# Patient Record
Sex: MALE | Race: WHITE | NOT HISPANIC OR LATINO | URBAN - METROPOLITAN AREA
[De-identification: names, ages, dates, MRNs, and addresses within clinical notes are randomized per-mention and may not be internally consistent; named-entity substitution may affect disease eponyms.]

---

## 2017-01-04 ENCOUNTER — EMERGENCY (EMERGENCY)
Facility: HOSPITAL | Age: 52
LOS: 1 days | End: 2017-01-04
Payer: MEDICAID

## 2017-01-04 DIAGNOSIS — Z93.0 TRACHEOSTOMY STATUS: Chronic | ICD-10-CM

## 2017-01-04 PROCEDURE — 99285 EMERGENCY DEPT VISIT HI MDM: CPT | Mod: 25

## 2017-01-05 PROCEDURE — 74177 CT ABD & PELVIS W/CONTRAST: CPT | Mod: 26

## 2017-01-11 ENCOUNTER — EMERGENCY (EMERGENCY)
Facility: HOSPITAL | Age: 52
LOS: 1 days | End: 2017-01-11
Payer: MEDICAID

## 2017-01-11 DIAGNOSIS — Z93.0 TRACHEOSTOMY STATUS: Chronic | ICD-10-CM

## 2017-01-11 PROCEDURE — 99284 EMERGENCY DEPT VISIT MOD MDM: CPT | Mod: 25

## 2017-01-12 PROCEDURE — 71010: CPT | Mod: 26

## 2017-01-18 ENCOUNTER — EMERGENCY (EMERGENCY)
Facility: HOSPITAL | Age: 52
LOS: 1 days | End: 2017-01-18
Payer: MEDICAID

## 2017-01-18 DIAGNOSIS — Z93.0 TRACHEOSTOMY STATUS: Chronic | ICD-10-CM

## 2017-01-18 PROCEDURE — 99284 EMERGENCY DEPT VISIT MOD MDM: CPT

## 2017-01-25 ENCOUNTER — EMERGENCY (EMERGENCY)
Facility: HOSPITAL | Age: 52
LOS: 1 days | End: 2017-01-25
Payer: MEDICAID

## 2017-01-25 DIAGNOSIS — Z93.0 TRACHEOSTOMY STATUS: Chronic | ICD-10-CM

## 2017-01-25 PROCEDURE — 99283 EMERGENCY DEPT VISIT LOW MDM: CPT | Mod: 25

## 2017-02-08 ENCOUNTER — OUTPATIENT (OUTPATIENT)
Dept: OUTPATIENT SERVICES | Facility: HOSPITAL | Age: 52
LOS: 1 days | End: 2017-02-08
Payer: MEDICAID

## 2017-02-08 DIAGNOSIS — Z93.0 TRACHEOSTOMY STATUS: Chronic | ICD-10-CM

## 2017-02-08 PROCEDURE — 74177 CT ABD & PELVIS W/CONTRAST: CPT | Mod: 26

## 2017-02-18 ENCOUNTER — EMERGENCY (EMERGENCY)
Facility: HOSPITAL | Age: 52
LOS: 1 days | End: 2017-02-18
Payer: MEDICAID

## 2017-02-18 DIAGNOSIS — Z93.0 TRACHEOSTOMY STATUS: Chronic | ICD-10-CM

## 2017-02-18 PROCEDURE — 99283 EMERGENCY DEPT VISIT LOW MDM: CPT | Mod: 25

## 2017-02-23 ENCOUNTER — EMERGENCY (EMERGENCY)
Facility: HOSPITAL | Age: 52
LOS: 1 days | End: 2017-02-23
Payer: MEDICAID

## 2017-02-23 DIAGNOSIS — Z93.0 TRACHEOSTOMY STATUS: Chronic | ICD-10-CM

## 2017-02-23 PROCEDURE — 99283 EMERGENCY DEPT VISIT LOW MDM: CPT | Mod: 25

## 2017-03-07 ENCOUNTER — EMERGENCY (EMERGENCY)
Facility: HOSPITAL | Age: 52
LOS: 1 days | End: 2017-03-07
Payer: MEDICAID

## 2017-03-07 DIAGNOSIS — Z93.0 TRACHEOSTOMY STATUS: Chronic | ICD-10-CM

## 2017-03-07 PROCEDURE — 99283 EMERGENCY DEPT VISIT LOW MDM: CPT | Mod: 25

## 2017-03-21 ENCOUNTER — EMERGENCY (EMERGENCY)
Facility: HOSPITAL | Age: 52
LOS: 1 days | End: 2017-03-21
Payer: MEDICAID

## 2017-03-21 DIAGNOSIS — Z93.0 TRACHEOSTOMY STATUS: Chronic | ICD-10-CM

## 2017-03-21 PROCEDURE — 99283 EMERGENCY DEPT VISIT LOW MDM: CPT | Mod: 25

## 2017-04-11 ENCOUNTER — EMERGENCY (EMERGENCY)
Facility: HOSPITAL | Age: 52
LOS: 1 days | End: 2017-04-11
Payer: MEDICAID

## 2017-04-11 DIAGNOSIS — Z93.0 TRACHEOSTOMY STATUS: Chronic | ICD-10-CM

## 2017-04-11 PROCEDURE — 99283 EMERGENCY DEPT VISIT LOW MDM: CPT | Mod: 25

## 2017-04-26 ENCOUNTER — EMERGENCY (EMERGENCY)
Facility: HOSPITAL | Age: 52
LOS: 1 days | End: 2017-04-26
Payer: MEDICAID

## 2017-04-26 DIAGNOSIS — Z93.0 TRACHEOSTOMY STATUS: Chronic | ICD-10-CM

## 2017-04-26 PROCEDURE — 99283 EMERGENCY DEPT VISIT LOW MDM: CPT | Mod: 25

## 2017-05-10 ENCOUNTER — EMERGENCY (EMERGENCY)
Facility: HOSPITAL | Age: 52
LOS: 1 days | End: 2017-05-10
Payer: MEDICAID

## 2017-05-10 DIAGNOSIS — Z93.0 TRACHEOSTOMY STATUS: Chronic | ICD-10-CM

## 2017-05-10 PROCEDURE — 99284 EMERGENCY DEPT VISIT MOD MDM: CPT | Mod: 25

## 2017-06-14 ENCOUNTER — EMERGENCY (EMERGENCY)
Facility: HOSPITAL | Age: 52
LOS: 1 days | End: 2017-06-14

## 2017-06-14 DIAGNOSIS — Z93.0 TRACHEOSTOMY STATUS: Chronic | ICD-10-CM

## 2017-06-16 ENCOUNTER — EMERGENCY (EMERGENCY)
Facility: HOSPITAL | Age: 52
LOS: 1 days | End: 2017-06-16
Payer: MEDICAID

## 2017-06-16 DIAGNOSIS — Z93.0 TRACHEOSTOMY STATUS: Chronic | ICD-10-CM

## 2017-06-16 PROCEDURE — 99284 EMERGENCY DEPT VISIT MOD MDM: CPT | Mod: 25

## 2017-06-17 ENCOUNTER — EMERGENCY (EMERGENCY)
Facility: HOSPITAL | Age: 52
LOS: 1 days | End: 2017-06-17
Payer: MEDICAID

## 2017-06-17 DIAGNOSIS — Z93.0 TRACHEOSTOMY STATUS: Chronic | ICD-10-CM

## 2017-06-17 PROCEDURE — 99284 EMERGENCY DEPT VISIT MOD MDM: CPT

## 2017-06-22 ENCOUNTER — APPOINTMENT (OUTPATIENT)
Dept: PAIN MANAGEMENT | Facility: CLINIC | Age: 52
End: 2017-06-22

## 2017-07-04 ENCOUNTER — EMERGENCY (EMERGENCY)
Facility: HOSPITAL | Age: 52
LOS: 1 days | End: 2017-07-04
Payer: MEDICAID

## 2017-07-04 DIAGNOSIS — Z93.0 TRACHEOSTOMY STATUS: Chronic | ICD-10-CM

## 2017-07-04 PROCEDURE — 99284 EMERGENCY DEPT VISIT MOD MDM: CPT | Mod: 25

## 2017-07-27 ENCOUNTER — EMERGENCY (EMERGENCY)
Facility: HOSPITAL | Age: 52
LOS: 1 days | End: 2017-07-27
Payer: MEDICAID

## 2017-07-27 DIAGNOSIS — Z93.0 TRACHEOSTOMY STATUS: Chronic | ICD-10-CM

## 2017-07-27 PROCEDURE — 99284 EMERGENCY DEPT VISIT MOD MDM: CPT | Mod: 25

## 2017-08-07 ENCOUNTER — EMERGENCY (EMERGENCY)
Facility: HOSPITAL | Age: 52
LOS: 1 days | End: 2017-08-07
Payer: MEDICAID

## 2017-08-07 DIAGNOSIS — Z93.0 TRACHEOSTOMY STATUS: Chronic | ICD-10-CM

## 2017-08-07 PROCEDURE — 99283 EMERGENCY DEPT VISIT LOW MDM: CPT | Mod: 25

## 2017-09-07 ENCOUNTER — EMERGENCY (EMERGENCY)
Facility: HOSPITAL | Age: 52
LOS: 1 days | End: 2017-09-07
Payer: MEDICAID

## 2017-09-07 DIAGNOSIS — Z93.0 TRACHEOSTOMY STATUS: Chronic | ICD-10-CM

## 2017-09-07 PROCEDURE — 99283 EMERGENCY DEPT VISIT LOW MDM: CPT | Mod: 25

## 2017-09-18 ENCOUNTER — EMERGENCY (EMERGENCY)
Facility: HOSPITAL | Age: 52
LOS: 1 days | End: 2017-09-18
Payer: MEDICAID

## 2017-09-18 DIAGNOSIS — Z93.0 TRACHEOSTOMY STATUS: Chronic | ICD-10-CM

## 2017-09-18 PROCEDURE — 99284 EMERGENCY DEPT VISIT MOD MDM: CPT | Mod: 25

## 2017-09-27 ENCOUNTER — EMERGENCY (EMERGENCY)
Facility: HOSPITAL | Age: 52
LOS: 1 days | End: 2017-09-27

## 2017-09-27 DIAGNOSIS — Z93.0 TRACHEOSTOMY STATUS: Chronic | ICD-10-CM

## 2017-11-24 ENCOUNTER — EMERGENCY (EMERGENCY)
Facility: HOSPITAL | Age: 52
LOS: 1 days | End: 2017-11-24
Payer: MEDICAID

## 2017-11-24 DIAGNOSIS — Z93.0 TRACHEOSTOMY STATUS: Chronic | ICD-10-CM

## 2017-11-24 PROCEDURE — 99284 EMERGENCY DEPT VISIT MOD MDM: CPT | Mod: 25

## 2017-12-01 ENCOUNTER — EMERGENCY (EMERGENCY)
Facility: HOSPITAL | Age: 52
LOS: 1 days | End: 2017-12-01
Payer: MEDICAID

## 2017-12-01 DIAGNOSIS — Z93.0 TRACHEOSTOMY STATUS: Chronic | ICD-10-CM

## 2017-12-01 PROCEDURE — 99284 EMERGENCY DEPT VISIT MOD MDM: CPT | Mod: 25

## 2017-12-15 ENCOUNTER — EMERGENCY (EMERGENCY)
Facility: HOSPITAL | Age: 52
LOS: 1 days | End: 2017-12-15
Payer: MEDICAID

## 2017-12-15 DIAGNOSIS — Z93.0 TRACHEOSTOMY STATUS: Chronic | ICD-10-CM

## 2017-12-15 PROCEDURE — 99284 EMERGENCY DEPT VISIT MOD MDM: CPT | Mod: 25

## 2017-12-19 ENCOUNTER — EMERGENCY (EMERGENCY)
Facility: HOSPITAL | Age: 52
LOS: 1 days | End: 2017-12-19
Payer: MEDICAID

## 2017-12-19 DIAGNOSIS — Z93.0 TRACHEOSTOMY STATUS: Chronic | ICD-10-CM

## 2017-12-19 PROCEDURE — 99284 EMERGENCY DEPT VISIT MOD MDM: CPT

## 2017-12-22 ENCOUNTER — EMERGENCY (EMERGENCY)
Facility: HOSPITAL | Age: 52
LOS: 1 days | End: 2017-12-22
Payer: MEDICAID

## 2017-12-22 DIAGNOSIS — Z93.0 TRACHEOSTOMY STATUS: Chronic | ICD-10-CM

## 2017-12-22 PROCEDURE — 99283 EMERGENCY DEPT VISIT LOW MDM: CPT | Mod: 25

## 2017-12-28 ENCOUNTER — EMERGENCY (EMERGENCY)
Facility: HOSPITAL | Age: 52
LOS: 1 days | End: 2017-12-28
Payer: MEDICAID

## 2017-12-28 DIAGNOSIS — Z93.0 TRACHEOSTOMY STATUS: Chronic | ICD-10-CM

## 2017-12-28 PROCEDURE — 99284 EMERGENCY DEPT VISIT MOD MDM: CPT | Mod: 25

## 2018-01-03 ENCOUNTER — EMERGENCY (EMERGENCY)
Facility: HOSPITAL | Age: 53
LOS: 1 days | End: 2018-01-03

## 2018-01-03 DIAGNOSIS — Z93.0 TRACHEOSTOMY STATUS: Chronic | ICD-10-CM

## 2018-01-06 ENCOUNTER — EMERGENCY (EMERGENCY)
Facility: HOSPITAL | Age: 53
LOS: 1 days | End: 2018-01-06
Payer: MEDICAID

## 2018-01-06 DIAGNOSIS — Z93.0 TRACHEOSTOMY STATUS: Chronic | ICD-10-CM

## 2018-01-06 PROCEDURE — 99284 EMERGENCY DEPT VISIT MOD MDM: CPT

## 2018-01-10 ENCOUNTER — EMERGENCY (EMERGENCY)
Facility: HOSPITAL | Age: 53
LOS: 1 days | End: 2018-01-10
Payer: MEDICAID

## 2018-01-10 DIAGNOSIS — Z93.0 TRACHEOSTOMY STATUS: Chronic | ICD-10-CM

## 2018-01-10 PROCEDURE — 99282 EMERGENCY DEPT VISIT SF MDM: CPT | Mod: 25

## 2018-01-17 ENCOUNTER — EMERGENCY (EMERGENCY)
Facility: HOSPITAL | Age: 53
LOS: 1 days | End: 2018-01-17
Payer: MEDICAID

## 2018-01-17 DIAGNOSIS — Z93.0 TRACHEOSTOMY STATUS: Chronic | ICD-10-CM

## 2018-01-17 PROCEDURE — 99283 EMERGENCY DEPT VISIT LOW MDM: CPT | Mod: 25

## 2018-01-24 ENCOUNTER — EMERGENCY (EMERGENCY)
Facility: HOSPITAL | Age: 53
LOS: 1 days | End: 2018-01-24
Payer: MEDICAID

## 2018-01-24 DIAGNOSIS — Z93.0 TRACHEOSTOMY STATUS: Chronic | ICD-10-CM

## 2018-01-24 PROCEDURE — 99283 EMERGENCY DEPT VISIT LOW MDM: CPT | Mod: 25

## 2018-01-31 ENCOUNTER — EMERGENCY (EMERGENCY)
Facility: HOSPITAL | Age: 53
LOS: 1 days | End: 2018-01-31
Payer: MEDICAID

## 2018-01-31 DIAGNOSIS — Z93.0 TRACHEOSTOMY STATUS: Chronic | ICD-10-CM

## 2018-01-31 PROCEDURE — 99283 EMERGENCY DEPT VISIT LOW MDM: CPT | Mod: 25

## 2018-02-09 ENCOUNTER — EMERGENCY (EMERGENCY)
Facility: HOSPITAL | Age: 53
LOS: 1 days | End: 2018-02-09
Payer: MEDICAID

## 2018-02-09 DIAGNOSIS — Z93.0 TRACHEOSTOMY STATUS: Chronic | ICD-10-CM

## 2018-02-09 PROCEDURE — 99284 EMERGENCY DEPT VISIT MOD MDM: CPT | Mod: 25

## 2018-02-16 ENCOUNTER — EMERGENCY (EMERGENCY)
Facility: HOSPITAL | Age: 53
LOS: 1 days | End: 2018-02-16
Payer: MEDICAID

## 2018-02-16 DIAGNOSIS — Z93.0 TRACHEOSTOMY STATUS: Chronic | ICD-10-CM

## 2018-02-16 PROCEDURE — 99282 EMERGENCY DEPT VISIT SF MDM: CPT | Mod: 25

## 2018-02-21 ENCOUNTER — EMERGENCY (EMERGENCY)
Facility: HOSPITAL | Age: 53
LOS: 1 days | End: 2018-02-21
Payer: MEDICAID

## 2018-02-21 DIAGNOSIS — Z93.0 TRACHEOSTOMY STATUS: Chronic | ICD-10-CM

## 2018-02-21 PROCEDURE — 99285 EMERGENCY DEPT VISIT HI MDM: CPT | Mod: 25

## 2018-02-22 PROCEDURE — 74177 CT ABD & PELVIS W/CONTRAST: CPT | Mod: 26

## 2018-02-22 PROCEDURE — 71045 X-RAY EXAM CHEST 1 VIEW: CPT | Mod: 26

## 2018-02-28 ENCOUNTER — EMERGENCY (EMERGENCY)
Facility: HOSPITAL | Age: 53
LOS: 1 days | End: 2018-02-28
Payer: MEDICAID

## 2018-02-28 DIAGNOSIS — Z93.0 TRACHEOSTOMY STATUS: Chronic | ICD-10-CM

## 2018-02-28 PROCEDURE — 99284 EMERGENCY DEPT VISIT MOD MDM: CPT | Mod: 25

## 2018-03-05 ENCOUNTER — EMERGENCY (EMERGENCY)
Facility: HOSPITAL | Age: 53
LOS: 1 days | End: 2018-03-05
Payer: MEDICAID

## 2018-03-05 DIAGNOSIS — Z93.0 TRACHEOSTOMY STATUS: Chronic | ICD-10-CM

## 2018-03-05 PROCEDURE — 99284 EMERGENCY DEPT VISIT MOD MDM: CPT | Mod: 25

## 2018-03-07 ENCOUNTER — EMERGENCY (EMERGENCY)
Facility: HOSPITAL | Age: 53
LOS: 1 days | End: 2018-03-07
Payer: MEDICAID

## 2018-03-07 DIAGNOSIS — Z93.0 TRACHEOSTOMY STATUS: Chronic | ICD-10-CM

## 2018-03-07 PROCEDURE — 99283 EMERGENCY DEPT VISIT LOW MDM: CPT | Mod: 25

## 2018-03-15 ENCOUNTER — EMERGENCY (EMERGENCY)
Facility: HOSPITAL | Age: 53
LOS: 1 days | End: 2018-03-15
Payer: MEDICAID

## 2018-03-15 DIAGNOSIS — Z93.0 TRACHEOSTOMY STATUS: Chronic | ICD-10-CM

## 2018-03-15 PROCEDURE — 99283 EMERGENCY DEPT VISIT LOW MDM: CPT | Mod: 25

## 2018-04-20 ENCOUNTER — EMERGENCY (EMERGENCY)
Facility: HOSPITAL | Age: 53
LOS: 1 days | End: 2018-04-20
Payer: MEDICAID

## 2018-04-20 DIAGNOSIS — Z93.0 TRACHEOSTOMY STATUS: Chronic | ICD-10-CM

## 2018-04-20 PROCEDURE — 99283 EMERGENCY DEPT VISIT LOW MDM: CPT | Mod: 25

## 2018-04-25 ENCOUNTER — EMERGENCY (EMERGENCY)
Facility: HOSPITAL | Age: 53
LOS: 1 days | End: 2018-04-25
Payer: MEDICAID

## 2018-04-25 DIAGNOSIS — Z93.0 TRACHEOSTOMY STATUS: Chronic | ICD-10-CM

## 2018-04-25 PROCEDURE — 99283 EMERGENCY DEPT VISIT LOW MDM: CPT | Mod: 25

## 2018-04-29 ENCOUNTER — EMERGENCY (EMERGENCY)
Facility: HOSPITAL | Age: 53
LOS: 1 days | End: 2018-04-29
Payer: MEDICAID

## 2018-04-29 DIAGNOSIS — Z93.0 TRACHEOSTOMY STATUS: Chronic | ICD-10-CM

## 2018-04-29 PROCEDURE — 72110 X-RAY EXAM L-2 SPINE 4/>VWS: CPT | Mod: 26

## 2018-04-29 PROCEDURE — 99283 EMERGENCY DEPT VISIT LOW MDM: CPT | Mod: 25

## 2018-05-01 ENCOUNTER — EMERGENCY (EMERGENCY)
Facility: HOSPITAL | Age: 53
LOS: 1 days | End: 2018-05-01
Payer: MEDICAID

## 2018-05-01 DIAGNOSIS — Z93.0 TRACHEOSTOMY STATUS: Chronic | ICD-10-CM

## 2018-05-01 PROCEDURE — 99283 EMERGENCY DEPT VISIT LOW MDM: CPT

## 2018-05-02 ENCOUNTER — EMERGENCY (EMERGENCY)
Facility: HOSPITAL | Age: 53
LOS: 1 days | End: 2018-05-02
Payer: MEDICAID

## 2018-05-02 DIAGNOSIS — Z93.0 TRACHEOSTOMY STATUS: Chronic | ICD-10-CM

## 2018-05-02 PROCEDURE — 99283 EMERGENCY DEPT VISIT LOW MDM: CPT | Mod: 25

## 2018-05-10 ENCOUNTER — EMERGENCY (EMERGENCY)
Facility: HOSPITAL | Age: 53
LOS: 1 days | End: 2018-05-10
Payer: MEDICAID

## 2018-05-10 DIAGNOSIS — Z93.0 TRACHEOSTOMY STATUS: Chronic | ICD-10-CM

## 2018-05-10 PROCEDURE — 99283 EMERGENCY DEPT VISIT LOW MDM: CPT | Mod: 25

## 2018-05-17 ENCOUNTER — EMERGENCY (EMERGENCY)
Facility: HOSPITAL | Age: 53
LOS: 1 days | End: 2018-05-17
Payer: MEDICAID

## 2018-05-17 DIAGNOSIS — Z93.0 TRACHEOSTOMY STATUS: Chronic | ICD-10-CM

## 2018-05-17 PROCEDURE — 99283 EMERGENCY DEPT VISIT LOW MDM: CPT | Mod: 25

## 2018-05-20 ENCOUNTER — EMERGENCY (EMERGENCY)
Facility: HOSPITAL | Age: 53
LOS: 1 days | End: 2018-05-20
Payer: MEDICAID

## 2018-05-20 DIAGNOSIS — Z93.0 TRACHEOSTOMY STATUS: Chronic | ICD-10-CM

## 2018-05-20 PROCEDURE — 99283 EMERGENCY DEPT VISIT LOW MDM: CPT | Mod: 25

## 2018-05-23 ENCOUNTER — EMERGENCY (EMERGENCY)
Facility: HOSPITAL | Age: 53
LOS: 1 days | End: 2018-05-23
Payer: MEDICAID

## 2018-05-23 DIAGNOSIS — Z93.0 TRACHEOSTOMY STATUS: Chronic | ICD-10-CM

## 2018-05-23 PROCEDURE — 99283 EMERGENCY DEPT VISIT LOW MDM: CPT | Mod: 25

## 2018-05-31 ENCOUNTER — EMERGENCY (EMERGENCY)
Facility: HOSPITAL | Age: 53
LOS: 1 days | End: 2018-05-31
Payer: MEDICAID

## 2018-05-31 DIAGNOSIS — Z93.0 TRACHEOSTOMY STATUS: Chronic | ICD-10-CM

## 2018-05-31 PROCEDURE — 99283 EMERGENCY DEPT VISIT LOW MDM: CPT | Mod: 25

## 2018-06-07 ENCOUNTER — EMERGENCY (EMERGENCY)
Facility: HOSPITAL | Age: 53
LOS: 1 days | End: 2018-06-07
Payer: MEDICAID

## 2018-06-07 DIAGNOSIS — Z93.0 TRACHEOSTOMY STATUS: Chronic | ICD-10-CM

## 2018-06-07 PROCEDURE — 99283 EMERGENCY DEPT VISIT LOW MDM: CPT | Mod: 25

## 2018-06-10 ENCOUNTER — EMERGENCY (EMERGENCY)
Facility: HOSPITAL | Age: 53
LOS: 1 days | End: 2018-06-10
Payer: MEDICAID

## 2018-06-10 DIAGNOSIS — Z93.0 TRACHEOSTOMY STATUS: Chronic | ICD-10-CM

## 2018-06-10 PROCEDURE — 99283 EMERGENCY DEPT VISIT LOW MDM: CPT | Mod: 25

## 2018-06-13 ENCOUNTER — EMERGENCY (EMERGENCY)
Facility: HOSPITAL | Age: 53
LOS: 1 days | End: 2018-06-13
Payer: MEDICAID

## 2018-06-13 DIAGNOSIS — Z93.0 TRACHEOSTOMY STATUS: Chronic | ICD-10-CM

## 2018-06-13 PROCEDURE — 99282 EMERGENCY DEPT VISIT SF MDM: CPT | Mod: 25

## 2018-06-14 ENCOUNTER — EMERGENCY (EMERGENCY)
Facility: HOSPITAL | Age: 53
LOS: 1 days | End: 2018-06-14
Payer: MEDICAID

## 2018-06-14 DIAGNOSIS — Z93.0 TRACHEOSTOMY STATUS: Chronic | ICD-10-CM

## 2018-06-14 PROCEDURE — 99283 EMERGENCY DEPT VISIT LOW MDM: CPT | Mod: 25

## 2018-07-04 ENCOUNTER — EMERGENCY (EMERGENCY)
Facility: HOSPITAL | Age: 53
LOS: 1 days | End: 2018-07-04
Payer: MEDICAID

## 2018-07-04 DIAGNOSIS — Z93.0 TRACHEOSTOMY STATUS: Chronic | ICD-10-CM

## 2018-07-04 PROCEDURE — 99283 EMERGENCY DEPT VISIT LOW MDM: CPT | Mod: 25

## 2018-07-09 ENCOUNTER — EMERGENCY (EMERGENCY)
Facility: HOSPITAL | Age: 53
LOS: 1 days | End: 2018-07-09
Payer: MEDICAID

## 2018-07-09 DIAGNOSIS — Z93.0 TRACHEOSTOMY STATUS: Chronic | ICD-10-CM

## 2018-07-09 PROCEDURE — 72131 CT LUMBAR SPINE W/O DYE: CPT | Mod: 26

## 2018-07-09 PROCEDURE — 99284 EMERGENCY DEPT VISIT MOD MDM: CPT | Mod: 25

## 2018-07-11 ENCOUNTER — EMERGENCY (EMERGENCY)
Facility: HOSPITAL | Age: 53
LOS: 1 days | End: 2018-07-11
Payer: MEDICAID

## 2018-07-11 DIAGNOSIS — Z93.0 TRACHEOSTOMY STATUS: Chronic | ICD-10-CM

## 2018-07-11 PROCEDURE — 99283 EMERGENCY DEPT VISIT LOW MDM: CPT | Mod: 25

## 2018-07-16 ENCOUNTER — EMERGENCY (EMERGENCY)
Facility: HOSPITAL | Age: 53
LOS: 1 days | End: 2018-07-16
Payer: MEDICAID

## 2018-07-16 DIAGNOSIS — Z93.0 TRACHEOSTOMY STATUS: Chronic | ICD-10-CM

## 2018-07-16 PROCEDURE — 99283 EMERGENCY DEPT VISIT LOW MDM: CPT

## 2018-07-22 ENCOUNTER — EMERGENCY (EMERGENCY)
Facility: HOSPITAL | Age: 53
LOS: 1 days | End: 2018-07-22
Payer: MEDICAID

## 2018-07-22 DIAGNOSIS — Z93.0 TRACHEOSTOMY STATUS: Chronic | ICD-10-CM

## 2018-07-22 PROCEDURE — 99282 EMERGENCY DEPT VISIT SF MDM: CPT | Mod: 25

## 2018-08-02 ENCOUNTER — EMERGENCY (EMERGENCY)
Facility: HOSPITAL | Age: 53
LOS: 1 days | End: 2018-08-02
Payer: MEDICAID

## 2018-08-02 DIAGNOSIS — Z93.0 TRACHEOSTOMY STATUS: Chronic | ICD-10-CM

## 2018-08-02 PROCEDURE — 99283 EMERGENCY DEPT VISIT LOW MDM: CPT | Mod: 25

## 2018-08-16 ENCOUNTER — EMERGENCY (EMERGENCY)
Facility: HOSPITAL | Age: 53
LOS: 1 days | End: 2018-08-16
Payer: MEDICAID

## 2018-08-16 DIAGNOSIS — Z93.0 TRACHEOSTOMY STATUS: Chronic | ICD-10-CM

## 2018-08-16 PROCEDURE — 99283 EMERGENCY DEPT VISIT LOW MDM: CPT | Mod: 25

## 2018-08-17 ENCOUNTER — EMERGENCY (EMERGENCY)
Facility: HOSPITAL | Age: 53
LOS: 1 days | Discharge: HOME | End: 2018-08-17
Attending: EMERGENCY MEDICINE
Payer: MEDICAID

## 2018-08-17 ENCOUNTER — APPOINTMENT (OUTPATIENT)
Dept: ORTHOPEDIC SURGERY | Facility: CLINIC | Age: 53
End: 2018-08-17
Payer: MEDICAID

## 2018-08-17 VITALS
TEMPERATURE: 97 F | HEART RATE: 98 BPM | DIASTOLIC BLOOD PRESSURE: 90 MMHG | SYSTOLIC BLOOD PRESSURE: 153 MMHG | OXYGEN SATURATION: 98 % | RESPIRATION RATE: 16 BRPM

## 2018-08-17 VITALS
DIASTOLIC BLOOD PRESSURE: 104 MMHG | SYSTOLIC BLOOD PRESSURE: 158 MMHG | HEART RATE: 102 BPM | WEIGHT: 195 LBS | BODY MASS INDEX: 28.88 KG/M2 | HEIGHT: 69 IN

## 2018-08-17 VITALS — HEIGHT: 70 IN | WEIGHT: 205.03 LBS

## 2018-08-17 DIAGNOSIS — Z93.0 TRACHEOSTOMY STATUS: Chronic | ICD-10-CM

## 2018-08-17 PROCEDURE — 99283 EMERGENCY DEPT VISIT LOW MDM: CPT

## 2018-08-17 PROCEDURE — 99205 OFFICE O/P NEW HI 60 MIN: CPT

## 2018-08-17 RX ORDER — FENTANYL CITRATE 50 UG/ML
1 INJECTION INTRAVENOUS ONCE
Qty: 0 | Refills: 0 | Status: DISCONTINUED | OUTPATIENT
Start: 2018-08-17 | End: 2018-08-17

## 2018-08-17 RX ADMIN — FENTANYL CITRATE 1 PATCH: 50 INJECTION INTRAVENOUS at 09:41

## 2018-08-17 NOTE — ED STATDOCS - NS ED ROS FT
no fever  no chest pain  no SOB  no abd pain  no HA  +Back pain  All other ROS negative except as per HPI

## 2018-08-17 NOTE — ED ADULT TRIAGE NOTE - CHIEF COMPLAINT QUOTE
low back pain , chronic pain, was referred to pain mgmt low back pain , chronic pain, was referred to pain mgmt, states ran out of meds,

## 2018-08-17 NOTE — ED STATDOCS - PSH
Abdominal Hernia Repair  Abdominal and left inguinal  History of Spinal Surgery  lumbar spinal fusion;  2001  History of Tonsillectomy  as a child  Laryngoscopy  s/p Laryngoscopy, 8/2010 and 12/2010  Malignant neoplasm of larynx  Laryngoscopy/Bronchoscopy with biopsy-7/2013  Status post tracheostomy

## 2018-08-17 NOTE — ED STATDOCS - MEDICAL DECISION MAKING DETAILS
Pt with chronic back pain. Give fentanyl patch, D/C with followup with pain management Pt with chronic back pain. Give fentanyl patch, D/C with followup with pain management. Patient given detailed discharge and return instructions and verbalized understanding.  Patient will follow up without fail.  All questions answered.

## 2018-08-17 NOTE — ED STATDOCS - OBJECTIVE STATEMENT
CC: Back pain, medication refill  Presenting symptoms: 52 y/o M pt with hx of laryngeal cancer and chronic back pain presents to ED for medication refill of Percocet for chronic back pain  Pertinent Positives: back pain  Pertinent Negatives:   Timing: Persistent  Quality: Tingling  Radiation: Right leg  Severity: Moderate  Aggravating Factors: None  Relieving Factors: None  Pt saw Dr. Brock today. Is f/u for MRI to see about surgical eligibility for chronic pain. Did not get pain medication from him and ran out. Will get another appointment as soon as possible CC: Back pain, medication refill  Presenting symptoms: 54 y/o M pt with hx of laryngeal cancer and chronic back pain presents to ED for medication refill of Percocet for chronic back pain  Pertinent Positives: back pain  Pertinent Negatives:   Timing: Persistent  Quality: Aching  Radiation: Right leg  Severity: Moderate  Aggravating Factors: None  Relieving Factors: None  Pt saw Dr. Brock today. Is f/u for MRI to see about surgical eligibility for chronic pain. Did not get pain medication from him and ran out. Will get another appointment as soon as possible

## 2018-08-17 NOTE — ED STATDOCS - PHYSICAL EXAMINATION
Constitutional: Alert, NAD.   Head: Atraumatic.   Eyes: Clear bilaterally. PERRL.   Cardiac: Normal rate.   Respiratory: Breath sounds clear bilaterally.   GI: Abdomen soft, non-tender, no guarding.   : No CVA or bladder tenderness.   Musculoskeletal: FROM, no muscle or joint tenderness or swelling.   Neuro: alert and oriented, no focal deficits, no motor or sensory deficits.   Skin: Dry, intact, no rash.   Psych: normal mood and affect.

## 2018-08-17 NOTE — ED STATDOCS - PMH
Accidental Fall  with lumbar spine injury in 2000  Anxiety    Family history of chemotherapy  9226-1107  History of Laryngeal Cancer  tx with radiation and chemo  History of radiation therapy  8316-2378  Malignant neoplasm of larynx    Sciatica of right side    Voice Disturbance

## 2018-08-28 ENCOUNTER — APPOINTMENT (OUTPATIENT)
Dept: ORTHOPEDIC SURGERY | Facility: CLINIC | Age: 53
End: 2018-08-28
Payer: MEDICAID

## 2018-08-28 ENCOUNTER — EMERGENCY (EMERGENCY)
Facility: HOSPITAL | Age: 53
LOS: 1 days | Discharge: HOME | End: 2018-08-28
Attending: EMERGENCY MEDICINE
Payer: MEDICAID

## 2018-08-28 VITALS
BODY MASS INDEX: 28.88 KG/M2 | HEIGHT: 69 IN | HEART RATE: 90 BPM | DIASTOLIC BLOOD PRESSURE: 91 MMHG | WEIGHT: 195 LBS | SYSTOLIC BLOOD PRESSURE: 147 MMHG

## 2018-08-28 VITALS
TEMPERATURE: 98 F | RESPIRATION RATE: 20 BRPM | WEIGHT: 205.03 LBS | DIASTOLIC BLOOD PRESSURE: 87 MMHG | HEIGHT: 70 IN | SYSTOLIC BLOOD PRESSURE: 132 MMHG | HEART RATE: 87 BPM | OXYGEN SATURATION: 97 %

## 2018-08-28 DIAGNOSIS — M70.61 TROCHANTERIC BURSITIS, RIGHT HIP: ICD-10-CM

## 2018-08-28 DIAGNOSIS — M79.1 MYALGIA: ICD-10-CM

## 2018-08-28 DIAGNOSIS — Z93.0 TRACHEOSTOMY STATUS: Chronic | ICD-10-CM

## 2018-08-28 PROCEDURE — 99284 EMERGENCY DEPT VISIT MOD MDM: CPT

## 2018-08-28 PROCEDURE — 20610 DRAIN/INJ JOINT/BURSA W/O US: CPT | Mod: RT

## 2018-08-28 PROCEDURE — 99284 EMERGENCY DEPT VISIT MOD MDM: CPT | Mod: 25

## 2018-08-28 PROCEDURE — 72100 X-RAY EXAM L-S SPINE 2/3 VWS: CPT

## 2018-08-28 PROCEDURE — 96372 THER/PROPH/DIAG INJ SC/IM: CPT

## 2018-08-28 PROCEDURE — 99214 OFFICE O/P EST MOD 30 MIN: CPT | Mod: 25

## 2018-08-28 RX ORDER — HYDROMORPHONE HYDROCHLORIDE 2 MG/ML
2 INJECTION INTRAMUSCULAR; INTRAVENOUS; SUBCUTANEOUS ONCE
Qty: 0 | Refills: 0 | Status: DISCONTINUED | OUTPATIENT
Start: 2018-08-28 | End: 2018-08-28

## 2018-08-28 RX ADMIN — HYDROMORPHONE HYDROCHLORIDE 2 MILLIGRAM(S): 2 INJECTION INTRAMUSCULAR; INTRAVENOUS; SUBCUTANEOUS at 10:30

## 2018-08-28 NOTE — ED ADULT NURSE NOTE - RESPIRATORY ASSESSMENT
Problem: Safety  Goal: Will remain free from falls  Bed in low locked position, call light within reach. Pt instructed to use call light for assistance. Pt verbalized understanding.         WDL

## 2018-08-28 NOTE — ED ADULT NURSE NOTE - NSIMPLEMENTINTERV_GEN_ALL_ED
Implemented All Universal Safety Interventions:  [place] to call system. Call bell, personal items and telephone within reach. Instruct patient to call for assistance. Room bathroom lighting operational. Non-slip footwear when patient is off stretcher. Physically safe environment: no spills, clutter or unnecessary equipment. Stretcher in lowest position, wheels locked, appropriate side rails in place.

## 2018-08-28 NOTE — ED STATDOCS - PMH
Accidental Fall  with lumbar spine injury in 2000  Anxiety    Family history of chemotherapy  3068-9706  History of Laryngeal Cancer  tx with radiation and chemo  History of radiation therapy  2985-7021  Malignant neoplasm of larynx    Sciatica of right side    Voice Disturbance

## 2018-08-28 NOTE — ED STATDOCS - OBJECTIVE STATEMENT
Patient is a 53 year old M with PMHx of laryngeal cancer and spine injury who presents to the ED complaining of back pain.  Patient states that he is waiting authorization for his pain management and CT scan.  Reports that his L5 is crushed and the pain has been worsening.  States that he takes Percocet  and has been on it since 2007.  Denies any other medical complaints at this time. Patient is a 53 year old M with PMHx of laryngeal cancer and chronic back pain who presents to the ED complaining of back pain.  Patient states that he is awaiting authorization for his pain management and CT scan.  Reports that his L5 is crushed and the pain has been worsening, as he could not sleep last night due to the pain.  States that he takes Percocet  and has been on it since 2007.  Denies any other medical complaints at this time.

## 2018-08-28 NOTE — ED ADULT NURSE NOTE - PMH
Accidental Fall  with lumbar spine injury in 2000  Anxiety    Family history of chemotherapy  0906-8967  History of Laryngeal Cancer  tx with radiation and chemo  History of radiation therapy  5429-9940  Malignant neoplasm of larynx    Sciatica of right side    Voice Disturbance

## 2018-08-31 ENCOUNTER — OTHER (OUTPATIENT)
Age: 53
End: 2018-08-31

## 2018-09-06 ENCOUNTER — APPOINTMENT (OUTPATIENT)
Dept: ORTHOPEDIC SURGERY | Facility: CLINIC | Age: 53
End: 2018-09-06
Payer: MEDICAID

## 2018-09-06 VITALS
HEART RATE: 103 BPM | HEIGHT: 69 IN | BODY MASS INDEX: 28.88 KG/M2 | SYSTOLIC BLOOD PRESSURE: 129 MMHG | WEIGHT: 195 LBS | DIASTOLIC BLOOD PRESSURE: 79 MMHG

## 2018-09-06 DIAGNOSIS — M48.061 SPINAL STENOSIS, LUMBAR REGION WITHOUT NEUROGENIC CLAUDICATION: ICD-10-CM

## 2018-09-06 PROCEDURE — 99214 OFFICE O/P EST MOD 30 MIN: CPT

## 2018-09-07 ENCOUNTER — MESSAGE (OUTPATIENT)
Age: 53
End: 2018-09-07

## 2018-09-08 ENCOUNTER — EMERGENCY (EMERGENCY)
Facility: HOSPITAL | Age: 53
LOS: 1 days | End: 2018-09-08
Attending: EMERGENCY MEDICINE
Payer: MEDICAID

## 2018-09-08 VITALS
WEIGHT: 205.03 LBS | HEIGHT: 70 IN | DIASTOLIC BLOOD PRESSURE: 86 MMHG | TEMPERATURE: 98 F | SYSTOLIC BLOOD PRESSURE: 135 MMHG | RESPIRATION RATE: 16 BRPM | OXYGEN SATURATION: 99 % | HEART RATE: 101 BPM

## 2018-09-08 DIAGNOSIS — Z93.0 TRACHEOSTOMY STATUS: Chronic | ICD-10-CM

## 2018-09-08 PROCEDURE — 99283 EMERGENCY DEPT VISIT LOW MDM: CPT

## 2018-09-08 RX ORDER — OXYCODONE AND ACETAMINOPHEN 5; 325 MG/1; MG/1
2 TABLET ORAL ONCE
Qty: 0 | Refills: 0 | Status: DISCONTINUED | OUTPATIENT
Start: 2018-09-08 | End: 2018-09-08

## 2018-09-08 RX ADMIN — OXYCODONE AND ACETAMINOPHEN 2 TABLET(S): 5; 325 TABLET ORAL at 13:56

## 2018-09-08 RX ADMIN — OXYCODONE AND ACETAMINOPHEN 2 TABLET(S): 5; 325 TABLET ORAL at 13:55

## 2018-09-08 NOTE — ED PROVIDER NOTE - CHPI ED SYMPTOMS NEG
no bowel dysfunction/no fatigue/no bladder dysfunction/no anorexia/no constipation/no tingling/no neck tenderness/no numbness/no motor function loss/no difficulty bearing weight

## 2018-09-08 NOTE — ED PROVIDER NOTE - MEDICAL DECISION MAKING DETAILS
The patient has chronic low back pain requesting percocet.  The patient ambulating well without neuro deificit

## 2018-09-08 NOTE — ED PROVIDER NOTE - PMH
Accidental Fall  with lumbar spine injury in 2000  Anxiety    Family history of chemotherapy  3444-8305  History of Laryngeal Cancer  tx with radiation and chemo  History of radiation therapy  0569-5654  Malignant neoplasm of larynx    Sciatica of right side    Voice Disturbance

## 2018-09-08 NOTE — ED ADULT NURSE NOTE - PMH
Accidental Fall  with lumbar spine injury in 2000  Anxiety    Family history of chemotherapy  7157-6402  History of Laryngeal Cancer  tx with radiation and chemo  History of radiation therapy  9381-7485  Malignant neoplasm of larynx    Sciatica of right side    Voice Disturbance

## 2018-09-08 NOTE — ED PROVIDER NOTE - OBJECTIVE STATEMENT
The patient is a 53 year old male with history of chronic low back pain presents with a fall requesting refill for percocet. No HA, No motor No sensory loss and no bladder no bowel dysfunction.  The patient already has appointment with pain management and also spine surgeon for surgery

## 2018-09-08 NOTE — ED ADULT NURSE NOTE - NSIMPLEMENTINTERV_GEN_ALL_ED
Implemented All Universal Safety Interventions:  [place] to call system. Call bell, personal items and telephone within reach. Instruct patient to call for assistance. Room bathroom lighting operational. Non-slip footwear when patient is off stretcher. Physically safe environment: no spills, clutter or unnecessary equipment. Stretcher in lowest position, wheels locked, appropriate side rails in place.

## 2018-09-08 NOTE — ED ADULT TRIAGE NOTE - CHIEF COMPLAINT QUOTE
pt biba from home c/o 10/10 lower back pain, states has surgery set up with dr gibson next month, trach pt

## 2018-09-08 NOTE — ED ADULT NURSE NOTE - OBJECTIVE STATEMENT
pt arrived with severe lower back pain 10/10 pt states has surgery set up for next month but pain was really bad today, pt with trachea due to esophageal ca

## 2018-09-10 ENCOUNTER — EMERGENCY (EMERGENCY)
Facility: HOSPITAL | Age: 53
LOS: 1 days | Discharge: HOME | End: 2018-09-10
Attending: EMERGENCY MEDICINE
Payer: MEDICAID

## 2018-09-10 VITALS
DIASTOLIC BLOOD PRESSURE: 99 MMHG | OXYGEN SATURATION: 94 % | RESPIRATION RATE: 18 BRPM | TEMPERATURE: 98 F | SYSTOLIC BLOOD PRESSURE: 157 MMHG | HEART RATE: 100 BPM

## 2018-09-10 VITALS — WEIGHT: 205.03 LBS | HEIGHT: 70 IN

## 2018-09-10 DIAGNOSIS — Z93.0 TRACHEOSTOMY STATUS: Chronic | ICD-10-CM

## 2018-09-10 PROCEDURE — 99283 EMERGENCY DEPT VISIT LOW MDM: CPT

## 2018-09-10 RX ORDER — OXYCODONE AND ACETAMINOPHEN 5; 325 MG/1; MG/1
1 TABLET ORAL ONCE
Qty: 0 | Refills: 0 | Status: DISCONTINUED | OUTPATIENT
Start: 2018-09-10 | End: 2018-09-10

## 2018-09-10 RX ORDER — IBUPROFEN 200 MG
600 TABLET ORAL ONCE
Qty: 0 | Refills: 0 | Status: COMPLETED | OUTPATIENT
Start: 2018-09-10 | End: 2018-09-10

## 2018-09-10 RX ORDER — MORPHINE SULFATE 50 MG/1
4 CAPSULE, EXTENDED RELEASE ORAL ONCE
Qty: 0 | Refills: 0 | Status: COMPLETED | OUTPATIENT
Start: 2018-09-10 | End: 2018-09-10

## 2018-09-10 RX ADMIN — OXYCODONE AND ACETAMINOPHEN 1 TABLET(S): 5; 325 TABLET ORAL at 13:32

## 2018-09-10 RX ADMIN — Medication 600 MILLIGRAM(S): at 13:32

## 2018-09-10 NOTE — ED ADULT TRIAGE NOTE - CHIEF COMPLAINT QUOTE
"I have lower back pain that is radiating down my right leg, I am supposed to have a surgery with Dr Brock but my pain is worsening"

## 2018-09-10 NOTE — ED STATDOCS - OBJECTIVE STATEMENT
52 y/o M with PMHx of lumbar spine injury, laryngeal cancer and R sided sciatica and PSHx of lumbar fusion and tracheostomy presents to ED c/o back pain radiating down the R leg, which is chronic but has been worsening over the past few days. Is scheduled for spine reconstruction surgery with Dr. Brock in a few weeks. Called orthopedic office this morning and was instructed to go to pain management doctor, but he was unable to tolerate the pain so came to the ED. 54 y/o M with PMHx of lumbar spine injury, laryngeal cancer and R sided sciatica and PSHx of lumbar fusion and tracheostomy presents to ED c/o back pain radiating down the R leg, which is chronic but has been worsening over the past few days. Is scheduled for spine reconstruction surgery with Dr. Brock in a few weeks. Called orthopedic office this morning and was instructed to go to pain management doctor (has an appointment scheduled for a few weeks from now), but he was unable to tolerate the pain so came to the ED.

## 2018-09-10 NOTE — ED ADULT NURSE NOTE - PMH
Accidental Fall  with lumbar spine injury in 2000  Anxiety    Family history of chemotherapy  3342-1638  History of Laryngeal Cancer  tx with radiation and chemo  History of radiation therapy  1649-5634  Malignant neoplasm of larynx    Sciatica of right side    Voice Disturbance

## 2018-09-10 NOTE — ED STATDOCS - PMH
Accidental Fall  with lumbar spine injury in 2000  Anxiety    Family history of chemotherapy  5831-1379  History of Laryngeal Cancer  tx with radiation and chemo  History of radiation therapy  2065-4526  Malignant neoplasm of larynx    Sciatica of right side    Voice Disturbance

## 2018-09-10 NOTE — ED ADULT NURSE NOTE - NSIMPLEMENTINTERV_GEN_ALL_ED
Implemented All Universal Safety Interventions:  [place] to call system. Call bell, personal items and telephone within reach. Instruct patient to call for assistance. Room bathroom lighting operational. Non-slip footwear when patient is off stretcher. Physically safe environment: no spills, clutter or unnecessary equipment. Stretcher in lowest position, wheels locked, appropriate side rails in place.

## 2018-09-11 ENCOUNTER — EMERGENCY (EMERGENCY)
Facility: HOSPITAL | Age: 53
LOS: 1 days | Discharge: HOME | End: 2018-09-11
Attending: EMERGENCY MEDICINE
Payer: MEDICAID

## 2018-09-11 VITALS — TEMPERATURE: 98 F | SYSTOLIC BLOOD PRESSURE: 155 MMHG | HEART RATE: 75 BPM | DIASTOLIC BLOOD PRESSURE: 96 MMHG

## 2018-09-11 VITALS — WEIGHT: 205.03 LBS | HEIGHT: 70 IN

## 2018-09-11 DIAGNOSIS — Z93.0 TRACHEOSTOMY STATUS: Chronic | ICD-10-CM

## 2018-09-11 DIAGNOSIS — F43.22 ADJUSTMENT DISORDER WITH ANXIETY: ICD-10-CM

## 2018-09-11 DIAGNOSIS — R69 ILLNESS, UNSPECIFIED: ICD-10-CM

## 2018-09-11 PROCEDURE — 99283 EMERGENCY DEPT VISIT LOW MDM: CPT

## 2018-09-11 PROCEDURE — 99284 EMERGENCY DEPT VISIT MOD MDM: CPT

## 2018-09-11 RX ORDER — FENTANYL CITRATE 50 UG/ML
1 INJECTION INTRAVENOUS
Qty: 0 | Refills: 0 | Status: DISCONTINUED | OUTPATIENT
Start: 2018-09-11 | End: 2018-09-11

## 2018-09-11 NOTE — ED STATDOCS - OBJECTIVE STATEMENT
52 y/o M pt with hx of chronic back pain, back surgery, and laryngeal CA in 2007 presents to ED c/o back pain. Pt was seen yesterday for back pain and was discharged home. Pt is awaiting back surgery with Dr. Brock and has an appointment with pain management next week. Pt has had multiple ED visits seeking pain medication. Denies fever and chills. No further complaints at this time.

## 2018-09-11 NOTE — ED ADULT TRIAGE NOTE - CHIEF COMPLAINT QUOTE
"I was here yesterday for back pain, I need surgery with Dr Brock but they sent me home and I am still in pain. " Pt A & OX4.

## 2018-09-11 NOTE — ED BEHAVIORAL HEALTH ASSESSMENT NOTE - DESCRIPTION
uneventful  focused on pain medications  Vital Signs Last 24 Hrs  T(C): 36.6 (11 Sep 2018 10:03), Max: 36.7 (10 Sep 2018 12:04)  T(F): 97.8 (11 Sep 2018 10:03), Max: 98 (10 Sep 2018 12:04)  HR: 75 (11 Sep 2018 10:03) (75 - 100)  BP: 155/96 (11 Sep 2018 10:03) (155/96 - 157/99)  RR: 18 (10 Sep 2018 12:04) (18 - 18)  SpO2: 94% (10 Sep 2018 12:04) (94% - 94%) back injury sciatica chronic neuropathic pain laryngeal carcinoma tracheostomy disabled

## 2018-09-11 NOTE — ED BEHAVIORAL HEALTH ASSESSMENT NOTE - HPI (INCLUDE ILLNESS QUALITY, SEVERITY, DURATION, TIMING, CONTEXT, MODIFYING FACTORS, ASSOCIATED SIGNS AND SYMPTOMS)
patient with frequent visits to ED for pain Living in DSS motel placement nearby Extensive medical history with reported plan for back surgery with dr Brock next month  [place] pain management was contacted by ED They do not recommend opioids in fact advise drug rehab or methadone maintenance neither of which patient feels needed  Patient denies past psych history Does not endorse any psychotic symptoms nor substance abuse uses

## 2018-09-11 NOTE — ED STATDOCS - PMH
Accidental Fall  with lumbar spine injury in 2000  Anxiety    Family history of chemotherapy  9365-4579  History of Laryngeal Cancer  tx with radiation and chemo  History of radiation therapy  9085-5771  Malignant neoplasm of larynx    Sciatica of right side    Voice Disturbance

## 2018-09-11 NOTE — ED ADULT NURSE NOTE - PMH
Accidental Fall  with lumbar spine injury in 2000  Anxiety    Family history of chemotherapy  2152-0217  History of Laryngeal Cancer  tx with radiation and chemo  History of radiation therapy  6305-4142  Malignant neoplasm of larynx    Sciatica of right side    Voice Disturbance

## 2018-09-11 NOTE — ED STATDOCS - PROGRESS NOTE DETAILS
Patient was Istop multiple narcotic prescriptions have been given to patient, patients pain management doctor called, according to pain management has not followed up since july, they recommend methadone, for pt to be in outpatient rehab, no narcotics, pt refuse4=s to see providers in pain management because they are not prescribing narcotics, dr. zuniga called to evaluate the pt Pt eloped

## 2018-09-11 NOTE — ED BEHAVIORAL HEALTH ASSESSMENT NOTE - CURRENT MEDICATION
Home Medications:  albuterol CFC free 90 mcg/inh inhalation aerosol: 2 puff(s) inhaled every 6 hours, As needed, Shortness of Breath and/or Wheezing (11 Sep 2018 10:20)

## 2018-09-11 NOTE — ED BEHAVIORAL HEALTH ASSESSMENT NOTE - SUMMARY
53 yr old with chronic pain issues neuropathic as well as history of laryngeal cancer No indication of illicit substance use disorder

## 2018-09-11 NOTE — ED STATDOCS - NS_ ATTENDINGSCRIBEDETAILS _ED_A_ED_FT
I, Kuldip Irving, performed the initial face to face bedside interview with this patient regarding history of present illness, review of symptoms and relevant past medical, social and family history.  I completed an independent physical examination.  I was the initial provider who evaluated this patient. I have signed out the follow up of any pending tests (i.e. labs, radiological studies) to the ACP.  I have communicated the patient’s plan of care and disposition with the ACP.  The history, relevant review of systems, past medical and surgical history, medical decision making, and physical examination was documented by the scribe in my presence and I attest to the accuracy of the documentation.

## 2018-09-11 NOTE — ED ADULT NURSE NOTE - OBJECTIVE STATEMENT
54 y/o male presents to ed reporting generalized back pain chronic. reports has appt with pain management next monday. patient has no pain medications. was seen here yesterday for same.

## 2018-09-13 NOTE — ED PROVIDER NOTE - OBJECTIVE STATEMENT
54 yo male pmh laryngeal ca, back pain on percocet for pain comes to ed for increased pain in lower back ; pt sees pain managent and is scheduled for back surgery with Dr Brock; denies any urinary or fecal incontinence; pt noted running out of pain meds

## 2018-09-13 NOTE — ED PROVIDER NOTE - PMH
Accidental Fall  with lumbar spine injury in 2000  Anxiety    Family history of chemotherapy  4618-1628  History of Laryngeal Cancer  tx with radiation and chemo  History of radiation therapy  9898-4190  Malignant neoplasm of larynx    Sciatica of right side    Voice Disturbance

## 2018-09-13 NOTE — ED PROVIDER NOTE - CARE PLAN
Principal Discharge DX:	Adjustment disorder with anxious mood  Secondary Diagnosis:	Opioid dependence with current use

## 2018-10-23 ENCOUNTER — EMERGENCY (EMERGENCY)
Facility: HOSPITAL | Age: 53
LOS: 1 days | Discharge: HOME | End: 2018-10-23
Attending: EMERGENCY MEDICINE
Payer: MEDICAID

## 2018-10-23 ENCOUNTER — APPOINTMENT (OUTPATIENT)
Dept: ORTHOPEDIC SURGERY | Facility: CLINIC | Age: 53
End: 2018-10-23
Payer: MEDICAID

## 2018-10-23 VITALS
DIASTOLIC BLOOD PRESSURE: 92 MMHG | SYSTOLIC BLOOD PRESSURE: 161 MMHG | TEMPERATURE: 98 F | HEART RATE: 75 BPM | RESPIRATION RATE: 17 BRPM | OXYGEN SATURATION: 96 %

## 2018-10-23 VITALS
HEART RATE: 80 BPM | HEIGHT: 69 IN | SYSTOLIC BLOOD PRESSURE: 149 MMHG | WEIGHT: 195 LBS | DIASTOLIC BLOOD PRESSURE: 89 MMHG | BODY MASS INDEX: 28.88 KG/M2

## 2018-10-23 VITALS — HEIGHT: 70 IN | WEIGHT: 199.96 LBS

## 2018-10-23 DIAGNOSIS — L59.8 OTHER SPECIFIED DISORDERS OF THE SKIN AND SUBCUTANEOUS TISSUE RELATED TO RADIATION: ICD-10-CM

## 2018-10-23 DIAGNOSIS — Z93.0 TRACHEOSTOMY STATUS: Chronic | ICD-10-CM
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
## 2018-10-23 PROCEDURE — 96372 THER/PROPH/DIAG INJ SC/IM: CPT

## 2018-10-23 PROCEDURE — 72110 X-RAY EXAM L-2 SPINE 4/>VWS: CPT

## 2018-10-23 PROCEDURE — 99283 EMERGENCY DEPT VISIT LOW MDM: CPT | Mod: 25

## 2018-10-23 PROCEDURE — 99215 OFFICE O/P EST HI 40 MIN: CPT

## 2018-10-23 PROCEDURE — 99283 EMERGENCY DEPT VISIT LOW MDM: CPT

## 2018-10-23 RX ORDER — ACETAMINOPHEN 500 MG
975 TABLET ORAL ONCE
Qty: 0 | Refills: 0 | Status: COMPLETED | OUTPATIENT
Start: 2018-10-23 | End: 2018-10-23

## 2018-10-23 RX ORDER — METHOCARBAMOL 500 MG/1
1500 TABLET, FILM COATED ORAL ONCE
Qty: 0 | Refills: 0 | Status: COMPLETED | OUTPATIENT
Start: 2018-10-23 | End: 2018-10-23

## 2018-10-23 RX ORDER — DEXAMETHASONE 0.5 MG/5ML
10 ELIXIR ORAL ONCE
Qty: 0 | Refills: 0 | Status: COMPLETED | OUTPATIENT
Start: 2018-10-23 | End: 2018-10-23

## 2018-10-23 RX ORDER — LIDOCAINE 4 G/100G
1 CREAM TOPICAL ONCE
Qty: 0 | Refills: 0 | Status: COMPLETED | OUTPATIENT
Start: 2018-10-23 | End: 2018-10-23

## 2018-10-23 RX ADMIN — METHOCARBAMOL 1500 MILLIGRAM(S): 500 TABLET, FILM COATED ORAL at 09:20

## 2018-10-23 RX ADMIN — Medication 975 MILLIGRAM(S): at 09:20

## 2018-10-23 RX ADMIN — LIDOCAINE 1 PATCH: 4 CREAM TOPICAL at 09:20

## 2018-10-23 RX ADMIN — Medication 10 MILLIGRAM(S): at 09:20

## 2018-10-23 NOTE — ED STATDOCS - OBJECTIVE STATEMENT
This is a 53 year old male with pmhx laryngeal CA s/p chemo with trach c/o chronic back pain, has been seen in ED multiple times requesting narcotics.  Patient is on suboxone.  Reports f/u with pain management currently taking 10mg of OXY he reports "isnt helping" and "needs something stronger."  He notes has appointment on Monday to adjust medications.  He reports recently , living in [place] and sleeping on sofa couch.  He reports "knows ER cant prescribe him something stronger" but would like some medication in ED.  He reports IBU "does nothing for him" and is "afraid will get a stomach ulcer" he "reports brother is a MD."  He notes follows up with MD hernandez and next month is doing SX to remove hardware and reconstructive surgery.  He denies any LE weakness or issues with bowel habits.  He denies any trauma or falls.

## 2018-10-23 NOTE — ED ADULT NURSE NOTE - NSIMPLEMENTINTERV_GEN_ALL_ED
Implemented All Universal Safety Interventions:  [place] to call system. Call bell, personal items and telephone within reach. Instruct patient to call for assistance. Room bathroom lighting operational. Non-slip footwear when patient is off stretcher. Physically safe environment: no spills, clutter or unnecessary equipment. Stretcher in lowest position, wheels locked, appropriate side rails in place.

## 2018-10-23 NOTE — ED ADULT TRIAGE NOTE - CHIEF COMPLAINT QUOTE
"I have lower back pain and was just next door and I don't see pain management until Monday and I need something for pain. " Pt A & Ox4.

## 2018-10-23 NOTE — ED STATDOCS - PROGRESS NOTE DETAILS
Patient ISTOPPED +active case received OXY 10mg on 10/11 dispensed on 10/17 45 pills 15 day supply.  At length discussion had with patient no Narcotics will be given in ED setting or RX.  Signed contract with pain management.  Advised to f/u with spine and pain management will TX with supportive care with tylenol, robaxin and lidoderm patch and steriod shot.

## 2018-10-23 NOTE — ED STATDOCS - PMH
Accidental Fall  with lumbar spine injury in 2000  Anxiety    Family history of chemotherapy  3220-7004  History of Laryngeal Cancer  tx with radiation and chemo  History of radiation therapy  6064-2269  Malignant neoplasm of larynx    Sciatica of right side    Voice Disturbance

## 2018-10-23 NOTE — ED STATDOCS - ATTENDING CONTRIBUTION TO CARE
seen with acp  hx of chronic back pain on maintenannce narcotics  no neuro findings  Agree with acps assessment hx andphysical  will not prescribe narcotics

## 2018-10-23 NOTE — ED ADULT NURSE NOTE - PMH
Accidental Fall  with lumbar spine injury in 2000  Anxiety    Family history of chemotherapy  5183-1277  History of Laryngeal Cancer  tx with radiation and chemo  History of radiation therapy  5730-5196  Malignant neoplasm of larynx    Sciatica of right side    Voice Disturbance

## 2018-10-23 NOTE — ED ADULT NURSE NOTE - OBJECTIVE STATEMENT
53 year old male a&ox3 comes to ED complaining of chronic generalized back pain and blood work to be done. patient does not see pain management until Monday. PA at bedside to evaluate patient.

## 2018-10-23 NOTE — ED STATDOCS - MUSCULOSKELETAL FINDINGS, MLM
TENDERNESS TENDERNESS/+midline lower lumbar scar, +paraspinal tenderness, no skin changes, no midline tenderness, straight leg test intact

## 2018-10-30 LAB — COTININE SERPL-MCNC: ABNORMAL

## 2018-11-02 ENCOUNTER — OTHER (OUTPATIENT)
Age: 53
End: 2018-11-02

## 2018-11-27 ENCOUNTER — MESSAGE (OUTPATIENT)
Age: 53
End: 2018-11-27

## 2018-12-08 ENCOUNTER — EMERGENCY (EMERGENCY)
Facility: HOSPITAL | Age: 53
LOS: 1 days | End: 2018-12-08
Payer: MEDICAID

## 2018-12-08 DIAGNOSIS — Z93.0 TRACHEOSTOMY STATUS: Chronic | ICD-10-CM

## 2018-12-08 PROCEDURE — 99283 EMERGENCY DEPT VISIT LOW MDM: CPT

## 2019-01-01 NOTE — ED STATDOCS - PSH
male  born at Gestational Age: 39w2d  to a 26 y.o.    via Vaginal, Spontaneous. GBS - PNL -. jerad- . ROM 3 hr PTD. breastfeeding. Down -7% since birth.  Feedings going well per mother.  Repeat bilirubin yesterday was HIR. Repeat today was LIR    Cleared for discharge home with PCP follow-up in a few days, sooner if concerns develop.  Risk factors of exclusive breastfeeding and mother of  descent.  PCP: Angela Kolb MD      Abdominal Hernia Repair  Abdominal and left inguinal  History of Spinal Surgery  lumbar spinal fusion;  2001  History of Tonsillectomy  as a child  Laryngoscopy  s/p Laryngoscopy, 8/2010 and 12/2010  Malignant neoplasm of larynx  Laryngoscopy/Bronchoscopy with biopsy-7/2013  Status post tracheostomy

## 2019-01-08 ENCOUNTER — APPOINTMENT (OUTPATIENT)
Dept: ORTHOPEDIC SURGERY | Facility: CLINIC | Age: 54
End: 2019-01-08
Payer: MEDICAID

## 2019-01-08 ENCOUNTER — EMERGENCY (EMERGENCY)
Facility: HOSPITAL | Age: 54
LOS: 1 days | Discharge: HOME | End: 2019-01-08
Attending: EMERGENCY MEDICINE
Payer: MEDICAID

## 2019-01-08 VITALS
TEMPERATURE: 97 F | OXYGEN SATURATION: 98 % | HEART RATE: 98 BPM | WEIGHT: 205.03 LBS | RESPIRATION RATE: 18 BRPM | DIASTOLIC BLOOD PRESSURE: 85 MMHG | HEIGHT: 70 IN | SYSTOLIC BLOOD PRESSURE: 141 MMHG

## 2019-01-08 VITALS
HEART RATE: 97 BPM | DIASTOLIC BLOOD PRESSURE: 95 MMHG | HEIGHT: 69 IN | SYSTOLIC BLOOD PRESSURE: 147 MMHG | BODY MASS INDEX: 28.88 KG/M2 | WEIGHT: 195 LBS

## 2019-01-08 DIAGNOSIS — M43.16 SPONDYLOLISTHESIS, LUMBAR REGION: ICD-10-CM

## 2019-01-08 DIAGNOSIS — Z87.891 PERSONAL HISTORY OF NICOTINE DEPENDENCE: ICD-10-CM

## 2019-01-08 DIAGNOSIS — Z93.0 TRACHEOSTOMY STATUS: Chronic | ICD-10-CM

## 2019-01-08 DIAGNOSIS — G89.4 CHRONIC PAIN SYNDROME: ICD-10-CM

## 2019-01-08 DIAGNOSIS — Z98.1 ARTHRODESIS STATUS: ICD-10-CM

## 2019-01-08 PROCEDURE — 99215 OFFICE O/P EST HI 40 MIN: CPT

## 2019-01-08 PROCEDURE — 99283 EMERGENCY DEPT VISIT LOW MDM: CPT

## 2019-01-08 RX ORDER — OXYCODONE AND ACETAMINOPHEN 5; 325 MG/1; MG/1
1 TABLET ORAL
Qty: 8 | Refills: 0
Start: 2019-01-08 | End: 2019-01-09

## 2019-01-08 RX ORDER — OXYCODONE AND ACETAMINOPHEN 5; 325 MG/1; MG/1
2 TABLET ORAL ONCE
Qty: 0 | Refills: 0 | Status: DISCONTINUED | OUTPATIENT
Start: 2019-01-08 | End: 2019-01-08

## 2019-01-08 RX ADMIN — OXYCODONE AND ACETAMINOPHEN 2 TABLET(S): 5; 325 TABLET ORAL at 09:22

## 2019-01-08 NOTE — ED STATDOCS - MEDICAL DECISION MAKING DETAILS
impression: pt with known back pain, exacerbation after working, will treat with pain medication, pt has follow up today with spinal surgeon for continuation of pain management.

## 2019-01-08 NOTE — ED ADULT NURSE NOTE - PMH
Accidental Fall  with lumbar spine injury in 2000  Anxiety    Family history of chemotherapy  9425-8485  History of Laryngeal Cancer  tx with radiation and chemo  History of radiation therapy  0161-8877  Malignant neoplasm of larynx    Sciatica of right side    Voice Disturbance

## 2019-01-08 NOTE — ED STATDOCS - PMH
Accidental Fall  with lumbar spine injury in 2000  Anxiety    Family history of chemotherapy  9577-5632  History of Laryngeal Cancer  tx with radiation and chemo  History of radiation therapy  4548-5538  Malignant neoplasm of larynx    Sciatica of right side    Voice Disturbance

## 2019-01-08 NOTE — ED STATDOCS - OBJECTIVE STATEMENT
54 y/o M pt with PMHx back surgery (2000), throat CA (2007)  presents to the ED for worsening back pain.  Several days ago pt was at work and lifted up a garage door, which aggravated his back pain.  Pt follows up with a pain management doctor for chronic back pain, last month doctor wanted to place pt on oxycodone, but pt refused.  Pt now wants to find a new pain management doctor, but is currently out of medications until he finds a new doctor.  Pt has an upcoming back reconstruction/revision surgery scheduled with Dr. Brock.  Pt has a follow up appt with Dr. Brock today.  Pt takes Percocet and valium for pain.  Denies fever, chills, N/V, CP, numbness.  No further acute complaints at this time.   Spinal Surgeon: Dr. Brock

## 2019-01-08 NOTE — ED ADULT NURSE NOTE - OBJECTIVE STATEMENT
pt found sitting in chair. pt is alert and oriented x 3, pt states that he was helping his brother at work and "twisted the wrong way". pt states he has previous back surgery. he states that he took ibuprofen at home but no relief and the pain started two days ago. pt given oxycodone as ordered.

## 2019-01-08 NOTE — ED ADULT TRIAGE NOTE - CHIEF COMPLAINT QUOTE
Patient arrived to ED today with c/o lower back pain.  Patient has chronic back issues.  Patient also goes to pain management and he recently had to leave that practice.  Patient reports he "ran out of his medicine". Patient is to have reconstructive surgery on his back in the near future by Dr. Brock.

## 2019-02-06 ENCOUNTER — EMERGENCY (EMERGENCY)
Facility: HOSPITAL | Age: 54
LOS: 1 days | Discharge: LEFT BEFORE DISCHARGE | End: 2019-02-06
Payer: MEDICAID

## 2019-02-06 ENCOUNTER — OTHER (OUTPATIENT)
Age: 54
End: 2019-02-06

## 2019-02-06 VITALS
SYSTOLIC BLOOD PRESSURE: 132 MMHG | DIASTOLIC BLOOD PRESSURE: 70 MMHG | HEART RATE: 84 BPM | OXYGEN SATURATION: 96 % | TEMPERATURE: 98 F | HEIGHT: 70 IN | WEIGHT: 205.03 LBS | RESPIRATION RATE: 18 BRPM

## 2019-02-06 DIAGNOSIS — Z93.0 TRACHEOSTOMY STATUS: Chronic | ICD-10-CM

## 2019-02-06 PROCEDURE — 99282 EMERGENCY DEPT VISIT SF MDM: CPT

## 2019-02-06 NOTE — ED ADULT NURSE NOTE - CHPI ED NUR SYMPTOMS NEG
no bowel dysfunction/no bladder dysfunction/no constipation/no numbness/no fatigue/no difficulty bearing weight/no motor function loss/no neck tenderness/no anorexia/no tingling

## 2019-02-06 NOTE — ED STATDOCS - MEDICAL DECISION MAKING DETAILS
I-Stop printed, extensive use of narcotics.  In the past 5 weeks he has gotten rx from multiple providers.  I explained to patient that he needs to go back to pain management, and Dr. Brock.  When offered toradol he became irate, yelling and agitated. I-Stop printed, extensive use of narcotics.  In the past 5 weeks he has gotten rx from multiple providers.  I explained to patient that he needs to go back to pain management, and Dr. Brock.  When offered toradol he became irate, yelling and agitated, stormed out of ED

## 2019-02-06 NOTE — ED STATDOCS - PMH
Accidental Fall  with lumbar spine injury in 2000  Anxiety    Family history of chemotherapy  3298-3920  History of Laryngeal Cancer  tx with radiation and chemo  History of radiation therapy  5406-6565  Malignant neoplasm of larynx    Sciatica of right side    Voice Disturbance

## 2019-02-06 NOTE — ED STATDOCS - ATTENDING CONTRIBUTION TO CARE
I, Rolan Solis, personally saw the patient with the PA, and completed the key components of the history and physical exam. I then discussed the management plan with the PA.    52 yo M hx of throat CA and chronic lower back pain p/w lower back pain. (-) incontinence or retention of bowel or bladder (-) fever (-) saddle anesthesia (-) neurological deficit. He report his pain management cut his percocet dose. He is being followed by ortho . Patient is ambulatory in the ED. ISTOP checked, he had been receiving percocet from multiple different provider.  He refused toradol, lidoderm, and referal to new pain management. Patient ELOPED from the ED without any difficulty in ambulation.

## 2019-02-06 NOTE — ED ADULT TRIAGE NOTE - CHIEF COMPLAINT QUOTE
Pt ambulatory in ED c/o lower back pain, reports is an on-going issue he see's Dr. Brock for. Pt states weather is making it worse.

## 2019-02-06 NOTE — ED ADULT NURSE NOTE - OBJECTIVE STATEMENT
Patient has chronic lower back pain that he states has been getting progressively worse. Patient ambulatory at this time, no injury

## 2019-02-14 ENCOUNTER — OTHER (OUTPATIENT)
Age: 54
End: 2019-02-14

## 2019-02-19 ENCOUNTER — APPOINTMENT (OUTPATIENT)
Dept: ORTHOPEDIC SURGERY | Facility: CLINIC | Age: 54
End: 2019-02-19
Payer: MEDICAID

## 2019-02-19 VITALS
DIASTOLIC BLOOD PRESSURE: 82 MMHG | HEART RATE: 92 BPM | HEIGHT: 69 IN | WEIGHT: 195 LBS | SYSTOLIC BLOOD PRESSURE: 129 MMHG | BODY MASS INDEX: 28.88 KG/M2

## 2019-02-19 DIAGNOSIS — J38.7 OTHER DISEASES OF LARYNX: ICD-10-CM

## 2019-02-19 DIAGNOSIS — Z72.0 TOBACCO USE: ICD-10-CM

## 2019-02-19 PROCEDURE — 99215 OFFICE O/P EST HI 40 MIN: CPT

## 2019-02-19 NOTE — HISTORY OF PRESENT ILLNESS
[de-identified] : 52 y/o M presents with lumbar spine pain radiating to his RLE with numbness and review of CT and MRI of lumbar spine. He states that his sciatica starts on his right leg after walking approximately 30 feet. He also states that he is starting to feel pain at his left hip. He states that he is trying to stretch but he is not finding relief. He has also had epidural injections which he states did not provide relief.Hx of CA. PSHx lumbar fusion in 2000. Pt has been out of work close to a year, but states he desires to return after surgery intervention. He is also going through a divorce, lives in a group home, and relies on transport. \par \par He was advised to get EMG's done last visit, has not yet had them done, scheduled for 3/11/19. States he has quit smoking cold turkey, he will need a nicotine level = 0 prior to surgical planning.  He was seen at [facility] earlier this month for uncontrolled back pain.  Has new pain management provider. .

## 2019-02-19 NOTE — PHYSICAL EXAM
[de-identified] : CONSTITUTIONAL: The patient is a very pleasant individual who is well-nourished and who appears stated age. Ambulatory with a cane.\par PSYCHIATRIC: The patient is alert and oriented X 3 and in no apparent distress, and participates with orthopedic evaluation well.\par HEAD: Atraumatic and is nonsyndromic in appearance.\par EENT: No visible thyromegaly, EOMI.\par RESPIRATORY: Respiratory rate is regular, not dyspneic on examination.\par LYMPHATICS: There is no inguinal lymphadenopathy\par INTEGUMENTARY: Skin is clean, dry, and intact about the bilateral lower extremities and lumbar spine.\par VASCULAR: There is brisk capillary refill about the bilateral lower extremities.\par NEUROLOGIC: There are no pathologic reflexes. Deep tendon reflexes are well maintained at 2+/4 of the bilateral lower extremities and are symmetric.\par MUSCULOSKELETAL: There is no visible muscular atrophy. Manual motor strength is well maintained in the bilateral lower extremities. Range of motion of lumbar spine is well maintained. The patient ambulates in a non-myelopathic manner. Negative tension sign and straight leg raise bilaterally. Quad extension, ankle dorsiflexion, EHL, plantar flexion, and ankle eversion are well preserved. Normal secondary orthopaedic exam of bilateral hips, greater trochanteric area, knees and ankles \par \par Lumbar incision is well healed. Visible sagittal deformity  B/l LE, I believe, components of neuropathy,and radiculopathy. Subjective motor weakness.  [de-identified] : no new imaging\par \par 10/30/18  Nicotine 15, Cotinine 286\par

## 2019-02-19 NOTE — DISCUSSION/SUMMARY
[Medication Risks Reviewed] : Medication risks reviewed [de-identified] : Medication risks reviewed. We discussed the absolute necessity of smoking cessation if surgical intervention is desired. Pt will also need to have his living situation rectify to help improve his outcome measures, and have a pain management provider prior to further surgical discussions. Overall, I think that this patient is a poor outcome because of the extent of the previous surgery and extent of new surgery, smoking, past radiation, etc. \par \par A long discussion was had with the patient regarding Lumbar surgical plan of L4-L5 revision surgery. Anatomic models, Xrays, CT scans/MRI’s were utilized to provide a firm understanding of their surgical plan. Patient is aware that surgery is elective in nature and he/she choosing to proceed with surgery. Risks, benefits, alternatives were discussed and all questions, comments and concerns were encouraged and answered to the patient's satisfaction. The statistical probability of improvement was discussed at length as well as post surgical course. Literature from [employer] spine society was provided to the patient regarding the specific type of surgery as well as a 5 page written surgical consent which the patient will need to sign and return to the office prior to surgical date. Consent forms highlight specific complications related to the complex nature of spinal surgery\par  \par Risks of lumbar surgery include: persistent pain, adjacent segment disease (which will require more surgery in future), dural tears, neurologic injury, and wound healing complications\par  \par Benefits of lumbar surgery include Improved neurologic and pain score\par  \par We also discussed with the patient complications of incisions directly related to obesity, diabetes, previous wound complications or post-surgical wound infections, smoking, neuropathy, and chronic anticoagulation. This risk has been specifically discussed and the patient will discuss modifiable risk factors to be optimized prior to surgical management. A multimodality approach of primary care physician, and medicine subspecialist will be utilized to optimize medical risk factors.\par  \par If patient is a smoker, discontinuation of smoking was advised and must be accomplished 6-8 weeks prior to surgery date. Patient was advised that help with quitting smoking is available through [employer] and phone number/website was provided, or patient can ask assistance from primary care provider. Elective surgery will not be performed unless patient complies with this policy. \par \par frankly discussed protracted recovery based upon past med hx and social situation including increased complication rate. ultimately predict a poor prognosis. Pt is not candidate for anterior based surgery secondary to previous abdominal surg with mesh present. \par \par Today he will be sent for nicotine test, if his level is zero, we can discuss surgery. EMG bilateral lower extremity will be done so that reasonable expectations post surgically will be had, scheduled for 3/11/19.  Return to office shortly after at which time surgical discussion can be had.

## 2019-02-20 NOTE — DISCUSSION/SUMMARY
[Medication Risks Reviewed] : Medication risks reviewed [de-identified] : We discussed the absolute necessity of smoking cessation if surgical intervention is desired. Pt will also need to have his living situation rectify to help improve his outcome measures, and have a pain management provider prior to further surgical discussions. Overall, I think that this patient is a poor outcome because of the extent of the previous surgery and extent of new surgery, smoking, past radiation, etc. \par \par A long discussion was had with the patient regarding Lumbar surgical plan of L4-L5 revision surgery. Anatomic models, Xrays, CT scans/MRI’s were utilized to provide a firm understanding of their surgical plan. Patient is aware that surgery is elective in nature and he/she choosing to proceed with surgery. Risks, benefits, alternatives were discussed and all questions, comments and concerns were encouraged and answered to the patient's satisfaction. The statistical probability of improvement was discussed at length as well as post surgical course. Literature from [employer] society was provided to the patient regarding the specific type of surgery as well as a 5 page written surgical consent which the patient will need to sign and return to the office prior to surgical date. Consent forms highlight specific complications related to the complex nature of spinal surgery\par  \par Risks of lumbar surgery include: persistent pain, adjacent segment disease (which will require more surgery in future), dural tears, neurologic injury, and wound healing complications\par  \par Benefits of lumbar surgery include Improved neurologic and pain score\par  \par We also discussed with the patient complications of incisions directly related to obesity, diabetes, previous wound complications or post-surgical wound infections, smoking, neuropathy, and chronic anticoagulation. This risk has been specifically discussed and the patient will discuss modifiable risk factors to be optimized prior to surgical management. A multimodality approach of primary care physician, and medicine subspecialist will be utilized to optimize medical risk factors.\par  \par If patient is a smoker, discontinuation of smoking was advised and must be accomplished 6-8 weeks prior to surgery date. Patient was advised that help with quitting smoking is available through [employer] and phone number/website was provided, or patient can ask assistance from primary care provider. Elective surgery will not be performed unless patient complies with this policy. \par \par frankly discussed protracted recovery based upon past med hx and social situation including increased complication rate. ultimately predict a poor prognosis. Pt is not candidate for anterior based surgery secondary to previous abdominal surg with mesh present. \par \par Today he will be sent for nicotine test, if his level is zero, we will reach out to him to discuss surgery as per above.  EMG bilateral lower extremity will be done so that reasonable expectations post surgically will be had.

## 2019-02-20 NOTE — PHYSICAL EXAM
[Poor Appearance] : well-appearing [Acute Distress] : not in acute distress [Obese] : not obese [de-identified] : CONSTITUTIONAL: The patient is a very pleasant individual who is well-nourished and who appears stated age. Ambulatory with a cane.\par PSYCHIATRIC: The patient is alert and oriented X 3 and in no apparent distress, and participates with orthopedic evaluation well.\par HEAD: Atraumatic and is nonsyndromic in appearance.\par EENT: No visible thyromegaly, EOMI.\par RESPIRATORY: Respiratory rate is regular, not dyspneic on examination.\par LYMPHATICS: There is no inguinal lymphadenopathy\par INTEGUMENTARY: Skin is clean, dry, and intact about the bilateral lower extremities and lumbar spine.\par VASCULAR: There is brisk capillary refill about the bilateral lower extremities.\par NEUROLOGIC: There are no pathologic reflexes. Deep tendon reflexes are well maintained at 2+/4 of the bilateral lower extremities and are symmetric.\par MUSCULOSKELETAL: There is no visible muscular atrophy. Manual motor strength is well maintained in the bilateral lower extremities. Range of motion of lumbar spine is well maintained. The patient ambulates in a non-myelopathic manner. Negative tension sign and straight leg raise bilaterally. Quad extension, ankle dorsiflexion, EHL, plantar flexion, and ankle eversion are well preserved. Normal secondary orthopaedic exam of bilateral hips, greater trochanteric area, knees and ankles \par \par Lumbar incision is well healed. Visible sagittal deformity. B/l LE, I believe, components of neuropathy, and radiculopathy. Subjective motor weakness.  [de-identified] : No new images were reviewed today. \par \par \par On January 26. 2019 Nicotine 15, Cotinine 286

## 2019-02-20 NOTE — HISTORY OF PRESENT ILLNESS
[de-identified] : 52 y/o M presents with lumbar spine pain radiating to his RLE with numbness and review of CT and MRI of lumbar spine. He states that his sciatica starts on his right leg after walking approximately 30 feet. He also states that he is starting to feel pain at his left hip. He states that he is trying to stretch but he is not finding relief. He has also had epidural injections which he states did not provide relief.Hx of CA. PSHx lumbar fusion in 2000. Pt has been out of work close to a year, but states he desires to return after surgery intervention. He is also going through a divorce, lives in a group home, and relies on transport. \par \par He was advised to get EMG's done last visit, has not yet had them done.  States he has quit smoking cold turkey. [Ataxia] : no ataxia [Incontinence] : no incontinence [Loss of Dexterity] : good dexterity [Urinary Ret.] : no urinary retention

## 2019-02-20 NOTE — ADDENDUM
[FreeTextEntry1] : Documented by Chivo Hall acting as a scribe for KATIANA Art on 01/08/2019\par All medical record entries made by the Scribe were at my, KATIANA Art, direction and personally dictated by me on 01/08/2019 . I have reviewed the chart and agree that the record accurately reflects my personal performance of the history, physical exam, assessment and plan. I have also personally directed, reviewed, and agreed with the chart.

## 2019-02-27 ENCOUNTER — EMERGENCY (EMERGENCY)
Facility: HOSPITAL | Age: 54
LOS: 1 days | End: 2019-02-27
Admitting: EMERGENCY MEDICINE
Payer: MEDICAID

## 2019-02-27 DIAGNOSIS — Z93.0 TRACHEOSTOMY STATUS: Chronic | ICD-10-CM

## 2019-02-27 PROCEDURE — 99283 EMERGENCY DEPT VISIT LOW MDM: CPT | Mod: 25

## 2019-03-11 ENCOUNTER — APPOINTMENT (OUTPATIENT)
Dept: PHYSICAL MEDICINE AND REHAB | Facility: CLINIC | Age: 54
End: 2019-03-11
Payer: MEDICAID

## 2019-03-11 DIAGNOSIS — M54.16 RADICULOPATHY, LUMBAR REGION: ICD-10-CM

## 2019-03-11 PROCEDURE — 95861 NEEDLE EMG 2 EXTREMITIES: CPT

## 2019-03-15 ENCOUNTER — APPOINTMENT (OUTPATIENT)
Dept: ORTHOPEDIC SURGERY | Facility: CLINIC | Age: 54
End: 2019-03-15
Payer: MEDICAID

## 2019-03-15 VITALS
HEIGHT: 69 IN | WEIGHT: 195 LBS | SYSTOLIC BLOOD PRESSURE: 129 MMHG | HEART RATE: 101 BPM | DIASTOLIC BLOOD PRESSURE: 88 MMHG | BODY MASS INDEX: 28.88 KG/M2

## 2019-03-15 DIAGNOSIS — F11.20 OPIOID DEPENDENCE, UNCOMPLICATED: ICD-10-CM

## 2019-03-15 DIAGNOSIS — G89.4 CHRONIC PAIN SYNDROME: ICD-10-CM

## 2019-03-15 DIAGNOSIS — F17.200 NICOTINE DEPENDENCE, UNSPECIFIED, UNCOMPLICATED: ICD-10-CM

## 2019-03-15 DIAGNOSIS — M48.00 SPINAL STENOSIS, SITE UNSPECIFIED: ICD-10-CM

## 2019-03-15 DIAGNOSIS — C32.9 MALIGNANT NEOPLASM OF LARYNX, UNSPECIFIED: ICD-10-CM

## 2019-03-15 PROCEDURE — 99215 OFFICE O/P EST HI 40 MIN: CPT

## 2019-03-15 NOTE — PHYSICAL EXAM
[de-identified] : CONSTITUTIONAL: The patient is a very pleasant individual who is well-nourished and who appears stated age. Ambulatory with a cane.\par PSYCHIATRIC: The patient is alert and oriented X 3 and in no apparent distress, and participates with orthopedic evaluation well.\par HEAD: Atraumatic and is nonsyndromic in appearance.\par EENT: No visible thyromegaly, EOMI.\par RESPIRATORY: Respiratory rate is regular, not dyspneic on examination.\par LYMPHATICS: There is no inguinal lymphadenopathy\par INTEGUMENTARY: Skin is clean, dry, and intact about the bilateral lower extremities and lumbar spine.\par VASCULAR: There is brisk capillary refill about the bilateral lower extremities.\par NEUROLOGIC: There are no pathologic reflexes. Deep tendon reflexes are well maintained at 2+/4 of the bilateral lower extremities and are symmetric.\par MUSCULOSKELETAL: There is no visible muscular atrophy. Manual motor strength is well maintained in the bilateral lower extremities. Range of motion of lumbar spine is well maintained. The patient ambulates in a non-myelopathic manner. Negative tension sign and straight leg raise bilaterally. Quad extension, ankle dorsiflexion, EHL, plantar flexion, and ankle eversion are well preserved. Normal secondary orthopaedic exam of bilateral hips, greater trochanteric area, knees and ankles \par \par Lumbar incision is well healed. Visible sagittal deformity  B/l LE, I believe, components of neuropathy,and radiculopathy. Subjective motor weakness.

## 2019-03-15 NOTE — ADDENDUM
[FreeTextEntry1] : Documented by Chivo Hall acting as a scribe for Dr. Robert Brock on 03/15/2019 . All medical record entries made by the Scribe were at my, Dr. Robert Brock, direction and personally dictated by me on 03/15/2019 . I have reviewed the chart and agree that the record accurately reflects my personal performance of the history, physical exam, assessment and plan. I have also personally directed, reviewed, and agreed with the chart.

## 2019-03-15 NOTE — HISTORY OF PRESENT ILLNESS
[de-identified] : 54 y/o M presents with lumbar spine pain radiating to his RLE with numbness. He states that his sciatica starts on his right leg after walking approximately 30 feet. He also states that he is starting to feel pain at his left hip. He states that he is trying to stretch but he is not finding relief. He has also had epidural injections which he states did not provide relief. Hx of CA. PSHx lumbar fusion in 2000. Pt has been out of work close to a year, but states he desires to return after surgery intervention. He is also going through a divorce, lives in a group home, and relies on transport.  [Ataxia] : no ataxia [Incontinence] : no incontinence [Loss of Dexterity] : good dexterity [Urinary Ret.] : no urinary retention

## 2019-03-15 NOTE — DISCUSSION/SUMMARY
[Medication Risks Reviewed] : Medication risks reviewed [de-identified] : I frankly discussed with the pt that he has an active PM doctor that is prescribing him 1 narcotic tablet per day, and via his ISTOP, he is going to [facility] every 3 days for additional narcotic therapy. Under no circumstances will spine surgery be performed in the scenario with a gentleman abusing narcotics when he has a PM doctor on board. If pt wishes to pursue surgery he needs to be off of narcotics and free of tobacco. \par \par Patient with multiple medical comorbidity increasing the risk of perioperative and postoperative complications as well as diminished spine outcomes as per the current medical literature.  These include but are not limited to obesity, anxiety/depression, cardiac illness, kidney disease, peripheral vascular disease, history of cancer, COPD, dysmetabolic syndrome including but not limited to diabetes, hyperlipidemia, hypertension.  Patient is being referred back to primary care provider for medical optimization, as well as other appropriate specialists as needed for optimization prior to spine surgery.

## 2020-05-02 NOTE — ED STATDOCS - CPE ED NEURO NORM
normal...
78 year old woman hx of HTN with paresthesias weakness and word findings difficulty.  Initial RUTHERFORD score 1 due to decrease left hand .  CT negative for ICH.  NIH 3.  Neuro intensivist at bedside.  Blood pressure medication needed prior to tPA.  tPA given and patient admitted to ICU

## 2021-01-05 NOTE — ED ADULT NURSE NOTE - NS TRANSFER PATIENT BELONGINGS
----- Message from Nell Benitez NP sent at 12/30/2020  8:14 AM CST -----  Please contact patient with lab results.  -electrolytes are within normal range.  Glucose is just a tad elevated above normal range at 103. Generally if fasting blood sugar is above 120, I will proceed with further testing to rule out diabetes.  We will recheck this in 6 months.  Kidney function is good, liver function is good.  -cholesterol levels are elevated with a total of 260, triglycerides 223, LDL elevated at 164, HDL is good at 51. I suggest lifestyle modification to include decreasing saturated fat intake.  Increasing fiber with daily psyllium.  Regular exercise for at least 30 minutes, at least 5 days per week.  I recommend rechecking a fasting lipid panel along with a CMP in 6 months.  -CBC is all within normal range.    Thank you, Nell      
Message conveyed, all additional questions and concerns addressed.   
Message left  
Clothing

## 2022-05-05 ENCOUNTER — APPOINTMENT (OUTPATIENT)
Dept: ORTHOPEDIC SURGERY | Facility: CLINIC | Age: 57
End: 2022-05-05
Payer: MEDICAID

## 2022-05-05 VITALS — WEIGHT: 170 LBS | HEIGHT: 70 IN | BODY MASS INDEX: 24.34 KG/M2

## 2022-05-05 DIAGNOSIS — M75.51 BURSITIS OF RIGHT SHOULDER: ICD-10-CM

## 2022-05-05 DIAGNOSIS — M75.101 UNSPECIFIED ROTATOR CUFF TEAR OR RUPTURE OF RIGHT SHOULDER, NOT SPECIFIED AS TRAUMATIC: ICD-10-CM

## 2022-05-05 DIAGNOSIS — M75.41 IMPINGEMENT SYNDROME OF RIGHT SHOULDER: ICD-10-CM

## 2022-05-05 DIAGNOSIS — M75.31 CALCIFIC TENDINITIS OF RIGHT SHOULDER: ICD-10-CM

## 2022-05-05 DIAGNOSIS — M12.811 UNSPECIFIED ROTATOR CUFF TEAR OR RUPTURE OF RIGHT SHOULDER, NOT SPECIFIED AS TRAUMATIC: ICD-10-CM

## 2022-05-05 DIAGNOSIS — M75.01 ADHESIVE CAPSULITIS OF RIGHT SHOULDER: ICD-10-CM

## 2022-05-05 PROCEDURE — 99214 OFFICE O/P EST MOD 30 MIN: CPT | Mod: 25

## 2022-05-05 PROCEDURE — 20610 DRAIN/INJ JOINT/BURSA W/O US: CPT

## 2022-05-05 NOTE — HISTORY OF PRESENT ILLNESS
[de-identified] : F/U neck and back \par Pt reports increase in neck and right shoulder pain x2 months with NKI\par Reports going to [facility] ER ~3 weeks ago- had CT of C spine \par Reports pain radiating into Right shoulder- reports being unable to reach overhead \par Reports numbness and tingling in right arm and left forearm \par Denies headaches, reports stiffness in neck \par Reports taking Gabapentin 300mg QD for pain (pt is currently on Suboxone)

## 2022-05-05 NOTE — REASON FOR VISIT
[FreeTextEntry2] : Neck and Back pain after falling down stairs 4/2019\par S/P Cervical fusion (?) with Dr Brock 4/2019\par S/P Lumbar fusion L5-S1 7/14/2020

## 2022-05-05 NOTE — IMAGING
[Right] : right shoulder [Outside films reviewed] : Outside films reviewed [Calcific density] : Calcific density

## 2022-05-05 NOTE — ASSESSMENT
[FreeTextEntry1] : Patient given prescription for MRI, follow up after study is completed to discuss results. \par \par Patient will begin physical therapy. \par \par Recommend: - rest - ice - compression - elevation

## 2022-05-05 NOTE — PHYSICAL EXAM
[Right] : right shoulder [4___] : external rotation 4[unfilled]/5 [5___] : internal rotation 5[unfilled]/5 [] : motor and sensory intact distally [TWNoteComboBox7] : active forward flexion 80 degrees [de-identified] : active abduction 60 degrees

## 2022-05-05 NOTE — PROCEDURE
[Large Joint Injection] : Large joint injection [Right] : of the right [Shoulder] : shoulder [Pain] : pain [Inflammation] : inflammation [Betadine] : betadine [___ cc    1%] : Lidocaine ~Vcc of 1%  [___ cc    40mg] : Triamcinolone (Kenalog) ~Vcc of 40 mg  [] : Patient tolerated procedure well [Call if redness, pain or fever occur] : call if redness, pain or fever occur [Apply ice for 15min out of every hour for the next 12-24 hours as tolerated] : apply ice for 15 minutes out of every hour for the next 12-24 hours as tolerated [Patient was advised to rest the joint(s) for ____ days] : patient was advised to rest the joint(s) for [unfilled] days [Previous OTC use and PT nontherapeutic] : patient has tried OTC's including aspirin, Ibuprofen, Aleve, etc or prescription NSAIDS, and/or exercises at home and/or physical therapy without satisfactory response [Patient had decreased mobility in the joint] : patient had decreased mobility in the joint [Risks, benefits, alternatives discussed / Verbal consent obtained] : the risks benefits, and alternatives have been discussed, and verbal consent was obtained

## 2022-06-02 ENCOUNTER — FORM ENCOUNTER (OUTPATIENT)
Age: 57
End: 2022-06-02

## 2022-07-27 NOTE — ED ADULT TRIAGE NOTE - HEIGHT IN INCHES
10 Spironolactone Counseling: Patient advised regarding risks of diarrhea, abdominal pain, hyperkalemia, birth defects (for female patients), liver toxicity and renal toxicity. The patient may need blood work to monitor liver and kidney function and potassium levels while on therapy. The patient verbalized understanding of the proper use and possible adverse effects of spironolactone.  All of the patient's questions and concerns were addressed.

## 2022-09-08 ENCOUNTER — APPOINTMENT (OUTPATIENT)
Dept: ORTHOPEDIC SURGERY | Facility: CLINIC | Age: 57
End: 2022-09-08

## 2023-02-06 ENCOUNTER — APPOINTMENT (OUTPATIENT)
Dept: OTOLARYNGOLOGY | Facility: CLINIC | Age: 58
End: 2023-02-06
Payer: MEDICAID

## 2023-02-06 PROCEDURE — 31575 DIAGNOSTIC LARYNGOSCOPY: CPT | Mod: 59

## 2023-02-06 PROCEDURE — 31615 TRCHEOBRNCHSC EST TRACHS INC: CPT

## 2023-02-06 PROCEDURE — 99204 OFFICE O/P NEW MOD 45 MIN: CPT | Mod: 25

## 2023-02-06 RX ORDER — ALBUTEROL SULFATE 90 UG/1
INHALANT RESPIRATORY (INHALATION)
Refills: 0 | Status: ACTIVE | COMMUNITY

## 2023-02-06 NOTE — ASSESSMENT
[FreeTextEntry1] : 57 yM with hx of laryngeal SCC s/p CRT in 2007, presents with tracheitis and concerns for R inferior peristomal infection vs recurrence. \par \par --Continue diflucan treatment for suspected infection\par --PET CT indicating activity in this region may represent acute infection vs peristomal recurrence. \par --Follow up 2 weeks, re-evaluation and biopsy of R inferior peristomal tissue. Repeat laryngoscopy at that time as well to assess keratosis of TVF. \par --Consider repeat imaging once acute infection resolves. \par --He is in agreement with this plan.

## 2023-02-06 NOTE — HISTORY OF PRESENT ILLNESS
[de-identified] : 57 year old male presents for increased mucus production, PET scan findings.\par  s/p larynx cancer treatment with RT/chemo 2007 -- details not available at this time\par Hx of PEG feeds two years ago for throat swelling. \par Visited [place] ER 2/5 for mucus build up in trach - discharged on Percocet and fluconazole. \par Cleans trach by himself - was placed in 2012.\par Current trach size is 6. Last trach change was in 2012 as per pt. \par Cleans by self. States noticing hemoptysis, mucus build up for 2 months. Denies foul smelling secretions.\par Called cancer center - stated its abscess vs. cancer \par States recent throat infections - was on antibiotics - 1 month ago prior\par Pt normally eats all consistencies - but past 2 months had to blend food.\par Recent weight loss of 15 pounds over past 2 months. \par \par PET CT scan done 1/31/23:\par IMPRESSION: Prominent hypermetabolic activity along the right side of tracheostomy tube as detailed above most consistent with severe infection/abscess given history of recent infection. Possibility of neoplastic recurrence in this region cannot be excluded but is unlikely.\par \par Hypermetabolic nodules in bilateral lung likely infectious in nature and related to aspiration. \par \par TSH W/ REFLEX TO FT4 1/23/21: 18.21

## 2023-02-06 NOTE — PHYSICAL EXAM
[Midline] : trachea located in midline position [Laryngoscopy Performed] : laryngoscopy was performed, see procedure section for findings [Normal] : no rashes [FreeTextEntry1] : post CRT [de-identified] : fibrotic, post RT change, tracheal stoma in place. peristomal grantulation, mass, R inferior, whitish plaque/exudate

## 2023-02-06 NOTE — CONSULT LETTER
[Dear  ___] : Dear  [unfilled], [Consult Letter:] : I had the pleasure of evaluating your patient, [unfilled]. [Please see my note below.] : Please see my note below. [Consult Closing:] : Thank you very much for allowing me to participate in the care of this patient.  If you have any questions, please do not hesitate to contact me. [Sincerely,] : Sincerely, [FreeTextEntry3] : Jossue New MD\par Head and Neck\par Surgical Oncology\par [address] [facility] [address]\par [phone]

## 2023-02-13 ENCOUNTER — APPOINTMENT (OUTPATIENT)
Dept: OTOLARYNGOLOGY | Facility: CLINIC | Age: 58
End: 2023-02-13
Payer: MEDICAID

## 2023-02-13 VITALS — TEMPERATURE: 98.1 F | BODY MASS INDEX: 18.9 KG/M2 | WEIGHT: 132 LBS | HEIGHT: 70 IN

## 2023-02-13 DIAGNOSIS — K14.8 OTHER DISEASES OF TONGUE: ICD-10-CM

## 2023-02-13 DIAGNOSIS — C01 MALIGNANT NEOPLASM OF BASE OF TONGUE: ICD-10-CM

## 2023-02-13 PROCEDURE — 99214 OFFICE O/P EST MOD 30 MIN: CPT | Mod: 25

## 2023-02-13 PROCEDURE — 31575 DIAGNOSTIC LARYNGOSCOPY: CPT

## 2023-02-13 NOTE — HISTORY OF PRESENT ILLNESS
[de-identified] : 57 year old man, follow up for tracheitis - concerns for Right inferior peristomal infection versus recurrence\par History of laryngeal SCC - s/p CRT 2007\par Recommended to continue Diflucan as prescribed - no relief\par s/p PET/CT 1/25/21\par Discussed option for biopsy  if necessary - repeat imaging if necessary\par States having hemoptysis - feeling something in throat on Left side - thick mucus produced\par Reports excess secretions from trach - no odor\par Unable to obtain nasal spray prescribed - insurance does not cover\par States swallowing is an issue - unable to eat - doing well with liquids - supplementing with Ensure\par Denies fever

## 2023-02-13 NOTE — ASSESSMENT
[FreeTextEntry1] : 57 yM with hx of laryngeal SCC s/p CRT in 2007, presents with tracheitis and concerns for R tongue base  recurrence. \par \par --Completed diflucan treatment for suspected infection\par --Obtain repeat PET CT to assess tongue base activity (infection resolved)\par --Plan for DL biopsy in OR, consented \par --Will schedule date for surgery\par --Smoking cessation discussed\par --He is in agreement with this plan.

## 2023-02-13 NOTE — PHYSICAL EXAM
[Midline] : trachea located in midline position [Laryngoscopy Performed] : laryngoscopy was performed, see procedure section for findings [Normal] : no rashes [FreeTextEntry1] : post CRT [de-identified] : fibrotic, post RT change, tracheal stoma in place. peristomal grantulation, mass, R inferior, whitish plaque/exudate [de-identified] : diffuse fullnes of base of tongue, whitish exudate, secretions obscures mucosa

## 2023-02-13 NOTE — CONSULT LETTER
[Consult Letter:] : I had the pleasure of evaluating your patient, [unfilled]. [Please see my note below.] : Please see my note below. [Consult Closing:] : Thank you very much for allowing me to participate in the care of this patient.  If you have any questions, please do not hesitate to contact me. [Sincerely,] : Sincerely, [FreeTextEntry3] : Jossue New MD\par Head and Neck\par Surgical Oncology\par [address] [facility] [address]\par [phone]

## 2023-02-23 ENCOUNTER — INPATIENT (INPATIENT)
Facility: HOSPITAL | Age: 58
LOS: 0 days | Discharge: HOME | End: 2023-02-23
Attending: OTOLARYNGOLOGY | Admitting: OTOLARYNGOLOGY
Payer: MEDICAID

## 2023-02-23 ENCOUNTER — TRANSCRIPTION ENCOUNTER (OUTPATIENT)
Age: 58
End: 2023-02-23

## 2023-02-23 VITALS
OXYGEN SATURATION: 99 % | HEART RATE: 74 BPM | RESPIRATION RATE: 18 BRPM | TEMPERATURE: 98 F | SYSTOLIC BLOOD PRESSURE: 96 MMHG | DIASTOLIC BLOOD PRESSURE: 61 MMHG

## 2023-02-23 VITALS
OXYGEN SATURATION: 95 % | HEART RATE: 78 BPM | SYSTOLIC BLOOD PRESSURE: 104 MMHG | TEMPERATURE: 98 F | RESPIRATION RATE: 17 BRPM | DIASTOLIC BLOOD PRESSURE: 64 MMHG

## 2023-02-23 DIAGNOSIS — R04.2 HEMOPTYSIS: ICD-10-CM

## 2023-02-23 DIAGNOSIS — Z93.0 TRACHEOSTOMY STATUS: Chronic | ICD-10-CM

## 2023-02-23 LAB
ALBUMIN SERPL ELPH-MCNC: 3.8 G/DL — SIGNIFICANT CHANGE UP (ref 3.3–5)
ALP SERPL-CCNC: 101 U/L — SIGNIFICANT CHANGE UP (ref 40–120)
ALT FLD-CCNC: 20 U/L — SIGNIFICANT CHANGE UP (ref 4–41)
ANION GAP SERPL CALC-SCNC: 13 MMOL/L — SIGNIFICANT CHANGE UP (ref 7–14)
APTT BLD: 32.7 SEC — SIGNIFICANT CHANGE UP (ref 27–36.3)
AST SERPL-CCNC: 24 U/L — SIGNIFICANT CHANGE UP (ref 4–40)
BASE EXCESS BLDV CALC-SCNC: 2.8 MMOL/L — SIGNIFICANT CHANGE UP (ref -2–3)
BASOPHILS # BLD AUTO: 0.06 K/UL — SIGNIFICANT CHANGE UP (ref 0–0.2)
BASOPHILS NFR BLD AUTO: 0.5 % — SIGNIFICANT CHANGE UP (ref 0–2)
BILIRUB SERPL-MCNC: <0.2 MG/DL — SIGNIFICANT CHANGE UP (ref 0.2–1.2)
BLOOD GAS VENOUS COMPREHENSIVE RESULT: SIGNIFICANT CHANGE UP
BUN SERPL-MCNC: 12 MG/DL — SIGNIFICANT CHANGE UP (ref 7–23)
CALCIUM SERPL-MCNC: 9.3 MG/DL — SIGNIFICANT CHANGE UP (ref 8.4–10.5)
CHLORIDE BLDV-SCNC: 100 MMOL/L — SIGNIFICANT CHANGE UP (ref 96–108)
CHLORIDE SERPL-SCNC: 100 MMOL/L — SIGNIFICANT CHANGE UP (ref 98–107)
CO2 BLDV-SCNC: 29.9 MMOL/L — HIGH (ref 22–26)
CO2 SERPL-SCNC: 24 MMOL/L — SIGNIFICANT CHANGE UP (ref 22–31)
CREAT SERPL-MCNC: 0.69 MG/DL — SIGNIFICANT CHANGE UP (ref 0.5–1.3)
EGFR: 107 ML/MIN/1.73M2 — SIGNIFICANT CHANGE UP
EOSINOPHIL # BLD AUTO: 0.23 K/UL — SIGNIFICANT CHANGE UP (ref 0–0.5)
EOSINOPHIL NFR BLD AUTO: 2.1 % — SIGNIFICANT CHANGE UP (ref 0–6)
FLUAV AG NPH QL: SIGNIFICANT CHANGE UP
FLUBV AG NPH QL: SIGNIFICANT CHANGE UP
GAS PNL BLDV: 133 MMOL/L — LOW (ref 136–145)
GLUCOSE BLDV-MCNC: 104 MG/DL — HIGH (ref 70–99)
GLUCOSE SERPL-MCNC: 105 MG/DL — HIGH (ref 70–99)
HCO3 BLDV-SCNC: 28 MMOL/L — SIGNIFICANT CHANGE UP (ref 22–29)
HCT VFR BLD CALC: 38.5 % — LOW (ref 39–50)
HCT VFR BLDA CALC: 40 % — SIGNIFICANT CHANGE UP (ref 39–51)
HGB BLD CALC-MCNC: 13.2 G/DL — SIGNIFICANT CHANGE UP (ref 12.6–17.4)
HGB BLD-MCNC: 12.7 G/DL — LOW (ref 13–17)
IANC: 8.97 K/UL — HIGH (ref 1.8–7.4)
IMM GRANULOCYTES NFR BLD AUTO: 0.4 % — SIGNIFICANT CHANGE UP (ref 0–0.9)
INR BLD: 0.97 RATIO — SIGNIFICANT CHANGE UP (ref 0.88–1.16)
LACTATE BLDV-MCNC: 1.1 MMOL/L — SIGNIFICANT CHANGE UP (ref 0.5–2)
LYMPHOCYTES # BLD AUTO: 1.24 K/UL — SIGNIFICANT CHANGE UP (ref 1–3.3)
LYMPHOCYTES # BLD AUTO: 11.3 % — LOW (ref 13–44)
MCHC RBC-ENTMCNC: 27.4 PG — SIGNIFICANT CHANGE UP (ref 27–34)
MCHC RBC-ENTMCNC: 33 GM/DL — SIGNIFICANT CHANGE UP (ref 32–36)
MCV RBC AUTO: 83.2 FL — SIGNIFICANT CHANGE UP (ref 80–100)
MONOCYTES # BLD AUTO: 0.48 K/UL — SIGNIFICANT CHANGE UP (ref 0–0.9)
MONOCYTES NFR BLD AUTO: 4.4 % — SIGNIFICANT CHANGE UP (ref 2–14)
NEUTROPHILS # BLD AUTO: 8.97 K/UL — HIGH (ref 1.8–7.4)
NEUTROPHILS NFR BLD AUTO: 81.3 % — HIGH (ref 43–77)
NRBC # BLD: 0 /100 WBCS — SIGNIFICANT CHANGE UP (ref 0–0)
NRBC # FLD: 0 K/UL — SIGNIFICANT CHANGE UP (ref 0–0)
PCO2 BLDV: 47 MMHG — SIGNIFICANT CHANGE UP (ref 42–55)
PH BLDV: 7.39 — SIGNIFICANT CHANGE UP (ref 7.32–7.43)
PLATELET # BLD AUTO: 324 K/UL — SIGNIFICANT CHANGE UP (ref 150–400)
PO2 BLDV: 52 MMHG — HIGH (ref 25–45)
POTASSIUM BLDV-SCNC: 4.3 MMOL/L — SIGNIFICANT CHANGE UP (ref 3.5–5.1)
POTASSIUM SERPL-MCNC: 4 MMOL/L — SIGNIFICANT CHANGE UP (ref 3.5–5.3)
POTASSIUM SERPL-SCNC: 4 MMOL/L — SIGNIFICANT CHANGE UP (ref 3.5–5.3)
PROT SERPL-MCNC: 6.9 G/DL — SIGNIFICANT CHANGE UP (ref 6–8.3)
PROTHROM AB SERPL-ACNC: 11.2 SEC — SIGNIFICANT CHANGE UP (ref 10.5–13.4)
RBC # BLD: 4.63 M/UL — SIGNIFICANT CHANGE UP (ref 4.2–5.8)
RBC # FLD: 16.1 % — HIGH (ref 10.3–14.5)
RSV RNA NPH QL NAA+NON-PROBE: SIGNIFICANT CHANGE UP
SAO2 % BLDV: 87.4 % — SIGNIFICANT CHANGE UP (ref 67–88)
SARS-COV-2 RNA SPEC QL NAA+PROBE: SIGNIFICANT CHANGE UP
SODIUM SERPL-SCNC: 137 MMOL/L — SIGNIFICANT CHANGE UP (ref 135–145)
WBC # BLD: 11.02 K/UL — HIGH (ref 3.8–10.5)
WBC # FLD AUTO: 11.02 K/UL — HIGH (ref 3.8–10.5)

## 2023-02-23 PROCEDURE — 70491 CT SOFT TISSUE NECK W/DYE: CPT | Mod: 26,MB

## 2023-02-23 PROCEDURE — 99285 EMERGENCY DEPT VISIT HI MDM: CPT

## 2023-02-23 RX ORDER — OXYCODONE HYDROCHLORIDE 5 MG/1
10 TABLET ORAL EVERY 6 HOURS
Refills: 0 | Status: DISCONTINUED | OUTPATIENT
Start: 2023-02-23 | End: 2023-02-23

## 2023-02-23 RX ORDER — SODIUM CHLORIDE 9 MG/ML
1000 INJECTION, SOLUTION INTRAVENOUS
Refills: 0 | Status: DISCONTINUED | OUTPATIENT
Start: 2023-02-23 | End: 2023-02-23

## 2023-02-23 RX ORDER — ALBUTEROL 90 UG/1
2 AEROSOL, METERED ORAL EVERY 6 HOURS
Refills: 0 | Status: DISCONTINUED | OUTPATIENT
Start: 2023-02-23 | End: 2023-02-23

## 2023-02-23 RX ORDER — INFLUENZA VIRUS VACCINE 15; 15; 15; 15 UG/.5ML; UG/.5ML; UG/.5ML; UG/.5ML
0.5 SUSPENSION INTRAMUSCULAR ONCE
Refills: 0 | Status: DISCONTINUED | OUTPATIENT
Start: 2023-02-23 | End: 2023-02-23

## 2023-02-23 RX ORDER — ACETAMINOPHEN 500 MG
1000 TABLET ORAL ONCE
Refills: 0 | Status: COMPLETED | OUTPATIENT
Start: 2023-02-23 | End: 2023-02-23

## 2023-02-23 RX ORDER — OXYCODONE HYDROCHLORIDE 5 MG/1
5 TABLET ORAL EVERY 6 HOURS
Refills: 0 | Status: DISCONTINUED | OUTPATIENT
Start: 2023-02-23 | End: 2023-02-23

## 2023-02-23 RX ORDER — OXYCODONE AND ACETAMINOPHEN 5; 325 MG/1; MG/1
1 TABLET ORAL
Qty: 4 | Refills: 0
Start: 2023-02-23 | End: 2023-02-24

## 2023-02-23 RX ORDER — ACETAMINOPHEN 500 MG
650 TABLET ORAL EVERY 6 HOURS
Refills: 0 | Status: DISCONTINUED | OUTPATIENT
Start: 2023-02-23 | End: 2023-02-23

## 2023-02-23 RX ORDER — HYDROMORPHONE HYDROCHLORIDE 2 MG/ML
1 INJECTION INTRAMUSCULAR; INTRAVENOUS; SUBCUTANEOUS ONCE
Refills: 0 | Status: DISCONTINUED | OUTPATIENT
Start: 2023-02-23 | End: 2023-02-23

## 2023-02-23 RX ADMIN — OXYCODONE HYDROCHLORIDE 5 MILLIGRAM(S): 5 TABLET ORAL at 18:29

## 2023-02-23 RX ADMIN — Medication 650 MILLIGRAM(S): at 19:07

## 2023-02-23 RX ADMIN — OXYCODONE HYDROCHLORIDE 5 MILLIGRAM(S): 5 TABLET ORAL at 17:29

## 2023-02-23 RX ADMIN — HYDROMORPHONE HYDROCHLORIDE 1 MILLIGRAM(S): 2 INJECTION INTRAMUSCULAR; INTRAVENOUS; SUBCUTANEOUS at 08:15

## 2023-02-23 RX ADMIN — HYDROMORPHONE HYDROCHLORIDE 1 MILLIGRAM(S): 2 INJECTION INTRAMUSCULAR; INTRAVENOUS; SUBCUTANEOUS at 07:46

## 2023-02-23 RX ADMIN — Medication 400 MILLIGRAM(S): at 11:52

## 2023-02-23 RX ADMIN — OXYCODONE HYDROCHLORIDE 5 MILLIGRAM(S): 5 TABLET ORAL at 11:27

## 2023-02-23 RX ADMIN — OXYCODONE HYDROCHLORIDE 5 MILLIGRAM(S): 5 TABLET ORAL at 10:57

## 2023-02-23 RX ADMIN — OXYCODONE HYDROCHLORIDE 10 MILLIGRAM(S): 5 TABLET ORAL at 13:30

## 2023-02-23 RX ADMIN — Medication 650 MILLIGRAM(S): at 18:07

## 2023-02-23 NOTE — PATIENT PROFILE ADULT - FALL HARM RISK - HARM RISK INTERVENTIONS

## 2023-02-23 NOTE — ED PROVIDER NOTE - CLINICAL SUMMARY MEDICAL DECISION MAKING FREE TEXT BOX
56 y/o male with pmhx of laryngeal cancer s/p laryngectomy and tracheostomy placed in 2011, chronic back pain on narcotics, anxiety presenting with hemoptysis with recent admission to [facility] for hemoptysis, diagnosed with tracheitis/finished course of antibiotics, left AMA yesterday, hemodynamically stable, no hypoxia or increased WOB, no active bleeding or secretions, afebrile with no associated infectious symptoms, not on AC. ENT consulted for scope given c/f for possible recurrent malignancy vs. tracheitis/abscess. Will also obtain CT neck, basic labs, and continue to reassess

## 2023-02-23 NOTE — ED PROVIDER NOTE - PHYSICAL EXAMINATION
Gen: Thin appearing, NAD, comfortable appearing, hemodynamically stable   HEENT: Trach site with no active drainage or bleeding, no nasal discharge, mucous membranes moist, no oropharyngeal edema/erythema/exudates   CV: RRR, +S1/S2, no M/R/G, equal b/l radial pulses 2+  Resp: CTAB, no W/R/R, SPO2 >95% on RA, no increased WOB, no stridor   GI: Abdomen soft non-distended, NTTP, no masses/organomegaly   MSK/Skin: No open wounds, no bruising  Neuro: A&Ox4, moving all 4 extremities spontaneously, gross sensation intact in UE and LE BL  Psych: appropriate mood

## 2023-02-23 NOTE — ED ADULT TRIAGE NOTE - CHIEF COMPLAINT QUOTE
Pt sent by doctor for bloody sputum. Endorses  sob, weight loss 25  weight loss in 2mth, coughing clots small like rice grain. Denies blood thinner use, fever, chills. Pt has trach, no oxygen required. PMHx: Neoplasm Pt sent by doctor for bloody sputum. Endorses  sob, weight loss 25 weight loss in 2mth, coughing clots small like rice grain. Denies blood thinner use, fever, chills. Pt has trach, no oxygen required, NAD.  PMHx: Neoplasm

## 2023-02-23 NOTE — ED PROVIDER NOTE - IV ALTEPLASE EXCL REL HIDDEN
----- Message from Molly Alfredo MD sent at 10/29/2020  6:10 PM CDT -----  Urine culture confirmed UTI. Please ask if her symptoms are improving.  
Patient states she still feeling symptomatic.  Feels very little improvement. Please advise if medication change is needed. Please send to [facility] [address] thank you  
Switched to macrobid.   
show

## 2023-02-23 NOTE — DISCHARGE NOTE PROVIDER - CARE PROVIDER_API CALL
Kishore New)  Otolaryngology  [phone] [place] [address]  Phone: [phone]  Fax: [phone]  Established Patient  Follow Up Time:

## 2023-02-23 NOTE — DISCHARGE NOTE NURSING/CASE MANAGEMENT/SOCIAL WORK - NSDCPEFALRISK_GEN_ALL_CORE
For information on Fall & Injury Prevention, visit: [email or web address] OR  [email or web address] OR  https://www.cdc.gov/steadi/patient.html

## 2023-02-23 NOTE — ED ADULT NURSE NOTE - CHIEF COMPLAINT QUOTE
Pt sent by doctor for bloody sputum. Endorses  sob, weight loss 25 weight loss in 2mth, coughing clots small like rice grain. Denies blood thinner use, fever, chills. Pt has trach, no oxygen required, NAD.  PMHx: Neoplasm

## 2023-02-23 NOTE — DISCHARGE NOTE PROVIDER - HOSPITAL COURSE
Patient was admitted to otolaryngology service for expedited workup. No bleeding this admission and CT shows no active extrav. Procedure was uncomplicated and the patient monitored post op. Patient had good pain control, tolerating diet. Stable for discharge

## 2023-02-23 NOTE — ED PROVIDER NOTE - OBJECTIVE STATEMENT
56 y/o male with pmhx of laryngeal cancer s/p laryngectomy and tracheostomy placed in 2011, chronic back pain on narcotics, anxiety presenting with hemoptysis. Recent admission to [facility] for hemoptysis, diagnosed with tracheitis/finished course of antibiotics, left AMA yesterday as he was unhappy with his care at [facility]. Reports hemoptysis for past 3 months with associated 30 pound weight loss. Hemoptysis from trach with associated small clots with no associated shortness of breath or difficulty breathing, wheezing, fever/chills, nausea/vomiting/diarrhea, cough, rashes. No recent leg swelling/pain, recent travel/bed bound nature.

## 2023-02-23 NOTE — ED ADULT NURSE REASSESSMENT NOTE - NS ED NURSE REASSESS COMMENT FT1
Pt refusing IV Tylenol. Pt requesting percocet. Pt states "I need to go outside to smoke a cigarette." ENT made aware. Pt sitting on cell phone at this time. Awaiting further orders.
Received report from night shift RN Lei. Pt resting comfortably in stretcher. Breathing even and unlabored. Trach on room air. Trach patent. Pt denies any physical complaints at this time. Pending CT. Pt given call bell. Pt verbalizing understanding to call for assistance.
Pt c/o 8/10 throat pain. ENT Dr. Mitchell made aware. Ok to give 10mg Oxycodone PRN for pain at this time as per MD Mitchell.

## 2023-02-23 NOTE — ED PROVIDER NOTE - NSICDXPASTMEDICALHX_GEN_ALL_CORE_FT
PAST MEDICAL HISTORY:  Accidental Fall with lumbar spine injury in 2000    Anxiety     Family history of chemotherapy [id number]    History of Laryngeal Cancer tx with radiation and chemo    History of radiation therapy [id number]    Malignant neoplasm of larynx     Sciatica of right side     Voice Disturbance

## 2023-02-23 NOTE — H&P ADULT - HISTORY OF PRESENT ILLNESS
HPI: 57y Male PMH laryngeal cancer s/p CRT 2007 and tracheostomy 2011, chronic back pain, anxiety, p/w hemotysis. Was recently admitted to [facility] for hemoptysis, was diagnosed with tracheitis and finished course of antibiotics. Left AMA yesterday because wasn't happy with care. Was seen most recently in the office last week with [facility] ENT Dr. New. He is pending a repeat CT/PET for surveillance but is awaiting insurance authorization. Most recent CT/PET was January 2023 which showed hypermetabolic activity along right side of trach tube for 3.2 cm with narrowing of trachea, 1 cm of focus posterior to clavicle possible reactive lymph node, mild to moderate activity in anterior middle lobe likely reflecting inflammation/infection likely due to aspiration. Read does not comment on oropharynx. Glottis/epiglottis normal. He reports blood tinged sputum with small clots intermittently which has been going on for 3 months. Also reports 30 lb weight loss due to dysphagia. Reports sharp pain/scar like sensation in left tongue when he swallows. Currently denies difficulty breathing. No SOB/drooling. No fevers/sweats/chills.     ALLERGIES  No Known Allergies      PAST MEDICAL & SURGICAL HISTORY:  History of Laryngeal Cancer tx with radiation and chemo    Accidental Fall with lumbar spine injury in 2000    Voice Disturbance      Malignant neoplasm of larynx      History of radiation therapy  [id number]      Family history of chemotherapy  [id number]      Sciatica of right side      Anxiety      History of Spinal Surgery  lumbar spinal fusion;  2001      Abdominal Hernia Repair  Abdominal and left inguinal      Laryngoscopy  s/p Laryngoscopy, 8/2010 and 12/2010      History of Tonsillectomy  as a child      Malignant neoplasm of larynx  Laryngoscopy/Bronchoscopy with biopsy-7/2013      Status post tracheostomy          MEDICATIONS:  Antiinfectives:     Hematologic/Anticoagulation:    Pain medications/Neuro:  acetaminophen   IVPB .. 1000 milliGRAM(s) IV Intermittent once  oxyCODONE    Solution 5 milliGRAM(s) Oral every 6 hours PRN    IV fluids:    Endocrine Medications:     All other standing medications:     All other PRN medications:      SOCIAL HISTORY:  Tobacco History: yes  ETOH Use:   Drug Use:     FAMILY HISTORY:  No pertinent family history in first degree relatives        REVIEW OF SYSTEMS:     LABS:  CBC-                        12.7   11.02 )-----------( 324      ( 23 Feb 2023 07:45 )             38.5       02-23    137  |  100  |  12  ----------------------------<  105<H>  4.0   |  24  |  0.69    Ca    9.3      23 Feb 2023 07:45    TPro  6.9  /  Alb  3.8  /  TBili  <0.2  /  DBili  x   /  AST  24  /  ALT  20  /  AlkPhos  101  02-23    Coagulation Studies-  PT/INR - ( 23 Feb 2023 07:45 )   PT: 11.2 sec;   INR: 0.97 ratio         PTT - ( 23 Feb 2023 07:45 )  PTT:32.7 sec  Endocrine Panel-  --  --  9.3 mg/dL      Vital Signs Last 24 Hrs  T(C): 36.7 (23 Feb 2023 09:29), Max: 36.7 (23 Feb 2023 06:14)  T(F): 98 (23 Feb 2023 09:29), Max: 98 (23 Feb 2023 06:14)  HR: 97 (23 Feb 2023 09:29) (78 - 97)  BP: 138/95 (23 Feb 2023 09:29) (104/64 - 138/95)  BP(mean): --  RR: 17 (23 Feb 2023 09:29) (17 - 17)  SpO2: 100% (23 Feb 2023 09:29) (95% - 100%)    Parameters below as of 23 Feb 2023 09:29  Patient On (Oxygen Delivery Method): room air      PHYSICAL EXAM:  Constitutional: Well-developed, well-nourished. Breathy voice.      Head:  normocephalic, atraumatic.   Ears:  Pinna wnl bilateral  Nose:  Septum intact, midline, deviated.  Inferior turbinates normal bilateral  OC/OP:  Floor of mouth soft, buccal mucosa, lips, hard palate, soft palate, uvula, posterior pharyngeal wall normal.  Mucosa moist. Base of tongue soft, normal to palpation.   Neck:  Trachea midline. Trach in place. Stoma with firm scar/ulcer/granulation tissue 7 oclock. No bleeding. Thyroid, parotid and submandibular glands normal.  Lymph:  No cervical adenopathy.  Skin:  No rash or lesions on exposed skin of head/neck      LARYNGOSCOPY EXAM:   Verbal consent was obtained from patient prior to procedure.    Flexible laryngoscopy was performed and revealed the following:    Nasopharynx had no mass or exudate    Symmetric base of tongue smoothness and fullness. No ulceration or discrete mass.    Vallecula was clear    Epiglottis normal. AE folds and arytenoids smooth, mild edema    True vocal folds were fully mobile and without lesions    Post cricoid area with mild edema    Interarytenoid mild edema    The laryngoscope was then passed through the trach stoma with the trach tube removed. The stoma is well formed. Small area of scar/healing ulcer 7-9 oclock. Slight injection/rawness of left trachea where tube normally sits. No malacia. No active bleeding. Clear view to anamika.     RADIOLOGY & ADDITIONAL STUDIES:  CT neck read pending.     A/P:  57y Male PMH laryngeal cancer s/p CRT 2007, trach in 2011, p/w hemoptysis and tongue pain.    - Admit to ENT under Dr. New  - f/u CT neck read  - CT angio neck  - MR neck w contrast  - Pain control  - Add on for possible DL with biopsy for tomorrow  - Needs consent

## 2023-02-23 NOTE — ED ADULT NURSE NOTE - OBJECTIVE STATEMENT
Pt received in TRA. C/o bloody sputum +small clots when coughing, intermittent SOB, night sweats, lost about 20 lbs within 3 months. Denies blood thinner use. PMH laryngeal ca, has trach not requiring O2. A&Ox4, ambulatory. Breathing even and unlabored. Pt in NAD. Denies dizziness, nausea, vomiting fever or chills. MD at bedside for eval. Pt received in TRA. C/o bloody sputum +small clots when coughing, intermittent SOB, night sweats, lost about 20 lbs within 3 months. Denies blood thinner use. Pt was admitted in [place], found to have tracheitis? Pt AMA, not satisfied w/ care. PMH laryngeal ca, has trach not requiring O2. A&Ox4, ambulatory. Breathing even and unlabored. Pt in NAD. Denies dizziness, nausea, vomiting fever or chills. Able to speak in complete sentences, no drooling noted. MD at bedside for eval. 20G IV placed on left AC. Labs drawn and sent. Medicated per MAR. Waiting for CT.

## 2023-02-23 NOTE — DISCHARGE NOTE PROVIDER - NSDCMRMEDTOKEN_GEN_ALL_CORE_FT
albuterol CFC free 90 mcg/inh inhalation aerosol: 2 puff(s) inhaled every 6 hours, As needed, Shortness of Breath and/or Wheezing  oxyCODONE-acetaminophen 10 mg-325 mg oral tablet: 1 tab(s) orally every 6 hours MDD:4

## 2023-02-23 NOTE — ED PROVIDER NOTE - ATTENDING CONTRIBUTION TO CARE
57-year-old male with history of laryngeal cancer status post laryngectomy and tracheostomy placed in 2011, chronic back pain on narcotics, anxiety, recent admission to [facility] for hemoptysis, diagnosed with tracheitis and was treated with course of antibiotics, left AMA yesterday as he was unhappy with his care at [facility], felt as though his pain was not being adequately managed and he was not having a biopsy in a reasonable timeframe to rule out malignancy.  Patient comes to our hospital for a reexamination by [facility] ENT team (has seen Dr. Michel in the past).    Patient endorses intermittent scant hemoptysis for the last several weeks with 30 pound weight loss over the last 2 to 3 months, intermittent dysphagia and sore throat with thick secretions from tracheostomy site.  No fevers.    Exam  No respiratory distress, clear lungs  No significant discharge noted from tracheostomy site or erythema around site    Assessment/plan  Concern for possible malignancy versus infectious etiology  Patient is not on anticoagulants, no systemic signs of coagulopathy  Will get CT of neck with IV contrast, labs, consult ENT

## 2023-02-23 NOTE — ED PROVIDER NOTE - NSICDXPASTSURGICALHX_GEN_ALL_CORE_FT
PAST SURGICAL HISTORY:  Abdominal Hernia Repair Abdominal and left inguinal    History of Spinal Surgery lumbar spinal fusion;  2001    History of Tonsillectomy as a child    Laryngoscopy s/p Laryngoscopy, 8/2010 and 12/2010    Malignant neoplasm of larynx Laryngoscopy/Bronchoscopy with biopsy-7/2013    Status post tracheostomy

## 2023-02-24 ENCOUNTER — APPOINTMENT (OUTPATIENT)
Dept: NUCLEAR MEDICINE | Facility: CLINIC | Age: 58
End: 2023-02-24

## 2023-02-27 ENCOUNTER — TRANSCRIPTION ENCOUNTER (OUTPATIENT)
Age: 58
End: 2023-02-27

## 2023-02-28 ENCOUNTER — OUTPATIENT (OUTPATIENT)
Dept: OUTPATIENT SERVICES | Facility: HOSPITAL | Age: 58
LOS: 1 days | Discharge: HOME | End: 2023-02-28
Payer: MEDICAID

## 2023-02-28 ENCOUNTER — TRANSCRIPTION ENCOUNTER (OUTPATIENT)
Age: 58
End: 2023-02-28

## 2023-02-28 ENCOUNTER — RESULT REVIEW (OUTPATIENT)
Age: 58
End: 2023-02-28

## 2023-02-28 ENCOUNTER — APPOINTMENT (OUTPATIENT)
Dept: OTOLARYNGOLOGY | Facility: HOSPITAL | Age: 58
End: 2023-02-28
Payer: MEDICAID

## 2023-02-28 VITALS
DIASTOLIC BLOOD PRESSURE: 61 MMHG | TEMPERATURE: 98 F | SYSTOLIC BLOOD PRESSURE: 103 MMHG | HEIGHT: 69 IN | HEART RATE: 63 BPM | RESPIRATION RATE: 16 BRPM | OXYGEN SATURATION: 99 % | WEIGHT: 169.98 LBS

## 2023-02-28 VITALS
SYSTOLIC BLOOD PRESSURE: 119 MMHG | RESPIRATION RATE: 17 BRPM | OXYGEN SATURATION: 98 % | HEART RATE: 71 BPM | DIASTOLIC BLOOD PRESSURE: 61 MMHG

## 2023-02-28 DIAGNOSIS — Z93.0 TRACHEOSTOMY STATUS: Chronic | ICD-10-CM

## 2023-02-28 DIAGNOSIS — K14.8 OTHER DISEASES OF TONGUE: ICD-10-CM

## 2023-02-28 DIAGNOSIS — C76.0 MALIGNANT NEOPLASM OF HEAD, FACE AND NECK: ICD-10-CM

## 2023-02-28 PROCEDURE — 31615 TRCHEOBRNCHSC EST TRACHS INC: CPT | Mod: GC

## 2023-02-28 PROCEDURE — 88305 TISSUE EXAM BY PATHOLOGIST: CPT | Mod: 26

## 2023-02-28 PROCEDURE — ZZZZZ: CPT

## 2023-02-28 PROCEDURE — 31536 LARYNGOSCOPY W/BX & OP SCOPE: CPT | Mod: GC

## 2023-02-28 DEVICE — TUBE TRACH SZ 6 UNCUFF FLEX REUSE: Type: IMPLANTABLE DEVICE | Status: FUNCTIONAL

## 2023-02-28 DEVICE — TUBE TRACH SZ 6 CUFF FLEX REUSE: Type: IMPLANTABLE DEVICE | Status: FUNCTIONAL

## 2023-02-28 RX ORDER — OXYCODONE HYDROCHLORIDE 5 MG/1
10 TABLET ORAL ONCE
Refills: 0 | Status: DISCONTINUED | OUTPATIENT
Start: 2023-02-28 | End: 2023-02-28

## 2023-02-28 RX ORDER — OXYCODONE HYDROCHLORIDE 5 MG/1
1 TABLET ORAL
Qty: 0 | Refills: 0 | DISCHARGE
Start: 2023-02-28 | End: 2023-03-01

## 2023-02-28 RX ORDER — OXYCODONE HYDROCHLORIDE 5 MG/1
1 TABLET ORAL
Qty: 8 | Refills: 0
Start: 2023-02-28 | End: 2023-03-01

## 2023-02-28 RX ORDER — ACETAMINOPHEN 500 MG
1 TABLET ORAL
Qty: 60 | Refills: 0
Start: 2023-02-28 | End: 2023-03-14

## 2023-02-28 RX ORDER — OXYCODONE HYDROCHLORIDE 5 MG/1
1 TABLET ORAL
Qty: 30 | Refills: 0
Start: 2023-02-28

## 2023-02-28 RX ORDER — OXYCODONE HYDROCHLORIDE 5 MG/1
5 TABLET ORAL ONCE
Refills: 0 | Status: DISCONTINUED | OUTPATIENT
Start: 2023-02-28 | End: 2023-02-28

## 2023-02-28 RX ORDER — ACETAMINOPHEN 500 MG
2 TABLET ORAL
Qty: 120 | Refills: 0
Start: 2023-02-28 | End: 2023-03-14

## 2023-02-28 RX ORDER — FENTANYL CITRATE 50 UG/ML
25 INJECTION INTRAVENOUS
Refills: 0 | Status: DISCONTINUED | OUTPATIENT
Start: 2023-02-28 | End: 2023-02-28

## 2023-02-28 RX ADMIN — OXYCODONE HYDROCHLORIDE 10 MILLIGRAM(S): 5 TABLET ORAL at 17:54

## 2023-02-28 NOTE — ASU DISCHARGE PLAN (ADULT/PEDIATRIC) - NS MD DC FALL RISK RISK
For information on Fall & Injury Prevention, visit: [email or web address] OR  [email or web address] OR  https://www.cdc.gov/steadi/patient.html

## 2023-02-28 NOTE — ASU PREOP CHECKLIST - INTERNAL PROSTHESES
4 screws and 2 rods in lower back and - plkate in upper neck/yes(specify) 4 screws and 2 rods in lower back and - plate in upper neck/yes(specify)

## 2023-02-28 NOTE — ASU PATIENT PROFILE, ADULT - NSICDXPASTMEDICALHX_GEN_ALL_CORE_FT
PAST MEDICAL HISTORY:  Accidental Fall with lumbar spine injury in 2000    Anxiety     Family history of chemotherapy [id number]    History of Laryngeal Cancer tx with radiation and chemo    History of radiation therapy [id number]    Malignant neoplasm of larynx     Sciatica of right side     Voice Disturbance

## 2023-02-28 NOTE — ASU DISCHARGE PLAN (ADULT/PEDIATRIC) - FOLLOW UP APPOINTMENTS
811 may also call Recovery Room (PACU) 24/7 @ [phone]/[facility]

## 2023-02-28 NOTE — ASU PATIENT PROFILE, ADULT - FALL HARM RISK - UNIVERSAL INTERVENTIONS
Bed in lowest position, wheels locked, appropriate side rails in place/Call bell, personal items and telephone in reach/Instruct patient to call for assistance before getting out of bed or chair/Non-slip footwear when patient is out of bed/[place] to call system/Physically safe environment - no spills, clutter or unnecessary equipment/Purposeful Proactive Rounding/Room/bathroom lighting operational, light cord in reach

## 2023-03-01 DIAGNOSIS — R07.0 PAIN IN THROAT: ICD-10-CM

## 2023-03-01 DIAGNOSIS — C32.9 MALIGNANT NEOPLASM OF LARYNX, UNSPECIFIED: ICD-10-CM

## 2023-03-01 DIAGNOSIS — Z85.21 PERSONAL HISTORY OF MALIGNANT NEOPLASM OF LARYNX: ICD-10-CM

## 2023-03-07 LAB — SURGICAL PATHOLOGY STUDY: SIGNIFICANT CHANGE UP

## 2023-03-09 ENCOUNTER — INPATIENT (INPATIENT)
Facility: HOSPITAL | Age: 58
LOS: 0 days | Discharge: LEFT BEFORE DISCHARGE | End: 2023-03-10
Attending: OTOLARYNGOLOGY | Admitting: OTOLARYNGOLOGY
Payer: MEDICAID

## 2023-03-09 ENCOUNTER — INPATIENT (INPATIENT)
Facility: HOSPITAL | Age: 58
LOS: 0 days | Discharge: LEFT BEFORE DISCHARGE | End: 2023-03-09
Attending: OTOLARYNGOLOGY | Admitting: OTOLARYNGOLOGY
Payer: MEDICAID

## 2023-03-09 VITALS
DIASTOLIC BLOOD PRESSURE: 54 MMHG | HEART RATE: 71 BPM | HEIGHT: 69 IN | OXYGEN SATURATION: 100 % | RESPIRATION RATE: 20 BRPM | TEMPERATURE: 99 F | SYSTOLIC BLOOD PRESSURE: 85 MMHG

## 2023-03-09 VITALS
RESPIRATION RATE: 18 BRPM | SYSTOLIC BLOOD PRESSURE: 113 MMHG | HEIGHT: 69 IN | OXYGEN SATURATION: 97 % | TEMPERATURE: 98 F | HEART RATE: 79 BPM | DIASTOLIC BLOOD PRESSURE: 46 MMHG

## 2023-03-09 VITALS
HEART RATE: 76 BPM | TEMPERATURE: 98 F | RESPIRATION RATE: 18 BRPM | DIASTOLIC BLOOD PRESSURE: 59 MMHG | SYSTOLIC BLOOD PRESSURE: 115 MMHG | OXYGEN SATURATION: 100 %

## 2023-03-09 DIAGNOSIS — C32.9 MALIGNANT NEOPLASM OF LARYNX, UNSPECIFIED: ICD-10-CM

## 2023-03-09 DIAGNOSIS — L02.11 CUTANEOUS ABSCESS OF NECK: ICD-10-CM

## 2023-03-09 DIAGNOSIS — Z93.0 TRACHEOSTOMY STATUS: Chronic | ICD-10-CM

## 2023-03-09 LAB
ALBUMIN SERPL ELPH-MCNC: 3.8 G/DL — SIGNIFICANT CHANGE UP (ref 3.3–5)
ALP SERPL-CCNC: 95 U/L — SIGNIFICANT CHANGE UP (ref 40–120)
ALT FLD-CCNC: 17 U/L — SIGNIFICANT CHANGE UP (ref 4–41)
ANION GAP SERPL CALC-SCNC: 11 MMOL/L — SIGNIFICANT CHANGE UP (ref 7–14)
APPEARANCE UR: CLEAR — SIGNIFICANT CHANGE UP
APTT BLD: 31.6 SEC — SIGNIFICANT CHANGE UP (ref 27–36.3)
AST SERPL-CCNC: 19 U/L — SIGNIFICANT CHANGE UP (ref 4–40)
BASE EXCESS BLDV CALC-SCNC: 4.6 MMOL/L — HIGH (ref -2–3)
BASOPHILS # BLD AUTO: 0.05 K/UL — SIGNIFICANT CHANGE UP (ref 0–0.2)
BASOPHILS NFR BLD AUTO: 0.5 % — SIGNIFICANT CHANGE UP (ref 0–2)
BILIRUB SERPL-MCNC: 0.2 MG/DL — SIGNIFICANT CHANGE UP (ref 0.2–1.2)
BILIRUB UR-MCNC: NEGATIVE — SIGNIFICANT CHANGE UP
BLOOD GAS VENOUS COMPREHENSIVE RESULT: SIGNIFICANT CHANGE UP
BUN SERPL-MCNC: 9 MG/DL — SIGNIFICANT CHANGE UP (ref 7–23)
CALCIUM SERPL-MCNC: 8.9 MG/DL — SIGNIFICANT CHANGE UP (ref 8.4–10.5)
CHLORIDE BLDV-SCNC: 101 MMOL/L — SIGNIFICANT CHANGE UP (ref 96–108)
CHLORIDE SERPL-SCNC: 101 MMOL/L — SIGNIFICANT CHANGE UP (ref 98–107)
CO2 BLDV-SCNC: 31.2 MMOL/L — HIGH (ref 22–26)
CO2 SERPL-SCNC: 25 MMOL/L — SIGNIFICANT CHANGE UP (ref 22–31)
COLOR SPEC: SIGNIFICANT CHANGE UP
CREAT SERPL-MCNC: 0.68 MG/DL — SIGNIFICANT CHANGE UP (ref 0.5–1.3)
DIFF PNL FLD: NEGATIVE — SIGNIFICANT CHANGE UP
EGFR: 108 ML/MIN/1.73M2 — SIGNIFICANT CHANGE UP
EOSINOPHIL # BLD AUTO: 0.17 K/UL — SIGNIFICANT CHANGE UP (ref 0–0.5)
EOSINOPHIL NFR BLD AUTO: 1.6 % — SIGNIFICANT CHANGE UP (ref 0–6)
FLUAV AG NPH QL: SIGNIFICANT CHANGE UP
FLUBV AG NPH QL: SIGNIFICANT CHANGE UP
GAS PNL BLDV: 133 MMOL/L — LOW (ref 136–145)
GLUCOSE BLDV-MCNC: 95 MG/DL — SIGNIFICANT CHANGE UP (ref 70–99)
GLUCOSE SERPL-MCNC: 102 MG/DL — HIGH (ref 70–99)
GLUCOSE UR QL: NEGATIVE — SIGNIFICANT CHANGE UP
HCO3 BLDV-SCNC: 30 MMOL/L — HIGH (ref 22–29)
HCT VFR BLD CALC: 36.2 % — LOW (ref 39–50)
HCT VFR BLDA CALC: 36 % — LOW (ref 39–51)
HGB BLD CALC-MCNC: 12 G/DL — LOW (ref 12.6–17.4)
HGB BLD-MCNC: 11.6 G/DL — LOW (ref 13–17)
IANC: 8.23 K/UL — HIGH (ref 1.8–7.4)
IMM GRANULOCYTES NFR BLD AUTO: 0.5 % — SIGNIFICANT CHANGE UP (ref 0–0.9)
INR BLD: 1.03 RATIO — SIGNIFICANT CHANGE UP (ref 0.88–1.16)
KETONES UR-MCNC: NEGATIVE — SIGNIFICANT CHANGE UP
LACTATE BLDV-MCNC: 0.8 MMOL/L — SIGNIFICANT CHANGE UP (ref 0.5–2)
LEUKOCYTE ESTERASE UR-ACNC: NEGATIVE — SIGNIFICANT CHANGE UP
LYMPHOCYTES # BLD AUTO: 1.19 K/UL — SIGNIFICANT CHANGE UP (ref 1–3.3)
LYMPHOCYTES # BLD AUTO: 11.4 % — LOW (ref 13–44)
MCHC RBC-ENTMCNC: 27.4 PG — SIGNIFICANT CHANGE UP (ref 27–34)
MCHC RBC-ENTMCNC: 32 GM/DL — SIGNIFICANT CHANGE UP (ref 32–36)
MCV RBC AUTO: 85.4 FL — SIGNIFICANT CHANGE UP (ref 80–100)
MONOCYTES # BLD AUTO: 0.71 K/UL — SIGNIFICANT CHANGE UP (ref 0–0.9)
MONOCYTES NFR BLD AUTO: 6.8 % — SIGNIFICANT CHANGE UP (ref 2–14)
NEUTROPHILS # BLD AUTO: 8.23 K/UL — HIGH (ref 1.8–7.4)
NEUTROPHILS NFR BLD AUTO: 79.2 % — HIGH (ref 43–77)
NITRITE UR-MCNC: NEGATIVE — SIGNIFICANT CHANGE UP
NRBC # BLD: 0 /100 WBCS — SIGNIFICANT CHANGE UP (ref 0–0)
NRBC # FLD: 0 K/UL — SIGNIFICANT CHANGE UP (ref 0–0)
PCO2 BLDV: 46 MMHG — SIGNIFICANT CHANGE UP (ref 42–55)
PH BLDV: 7.42 — SIGNIFICANT CHANGE UP (ref 7.32–7.43)
PH UR: 6 — SIGNIFICANT CHANGE UP (ref 5–8)
PLATELET # BLD AUTO: 318 K/UL — SIGNIFICANT CHANGE UP (ref 150–400)
PO2 BLDV: 49 MMHG — HIGH (ref 25–45)
POTASSIUM BLDV-SCNC: 3.9 MMOL/L — SIGNIFICANT CHANGE UP (ref 3.5–5.1)
POTASSIUM SERPL-MCNC: 4.1 MMOL/L — SIGNIFICANT CHANGE UP (ref 3.5–5.3)
POTASSIUM SERPL-SCNC: 4.1 MMOL/L — SIGNIFICANT CHANGE UP (ref 3.5–5.3)
PROT SERPL-MCNC: 6.8 G/DL — SIGNIFICANT CHANGE UP (ref 6–8.3)
PROT UR-MCNC: NEGATIVE — SIGNIFICANT CHANGE UP
PROTHROM AB SERPL-ACNC: 12 SEC — SIGNIFICANT CHANGE UP (ref 10.5–13.4)
RBC # BLD: 4.24 M/UL — SIGNIFICANT CHANGE UP (ref 4.2–5.8)
RBC # FLD: 15.9 % — HIGH (ref 10.3–14.5)
RSV RNA NPH QL NAA+NON-PROBE: SIGNIFICANT CHANGE UP
SAO2 % BLDV: 82.8 % — SIGNIFICANT CHANGE UP (ref 67–88)
SARS-COV-2 RNA SPEC QL NAA+PROBE: SIGNIFICANT CHANGE UP
SODIUM SERPL-SCNC: 137 MMOL/L — SIGNIFICANT CHANGE UP (ref 135–145)
SP GR SPEC: 1.01 — SIGNIFICANT CHANGE UP (ref 1.01–1.05)
UROBILINOGEN FLD QL: SIGNIFICANT CHANGE UP
WBC # BLD: 10.4 K/UL — SIGNIFICANT CHANGE UP (ref 3.8–10.5)
WBC # FLD AUTO: 10.4 K/UL — SIGNIFICANT CHANGE UP (ref 3.8–10.5)

## 2023-03-09 PROCEDURE — 71045 X-RAY EXAM CHEST 1 VIEW: CPT | Mod: 26

## 2023-03-09 PROCEDURE — G1004: CPT

## 2023-03-09 PROCEDURE — 99285 EMERGENCY DEPT VISIT HI MDM: CPT

## 2023-03-09 PROCEDURE — 70491 CT SOFT TISSUE NECK W/DYE: CPT | Mod: 26,MG

## 2023-03-09 RX ORDER — ACETAMINOPHEN 500 MG
975 TABLET ORAL EVERY 6 HOURS
Refills: 0 | Status: DISCONTINUED | OUTPATIENT
Start: 2023-03-09 | End: 2023-03-10

## 2023-03-09 RX ORDER — FOLIC ACID 0.8 MG
1 TABLET ORAL
Qty: 0 | Refills: 0 | DISCHARGE

## 2023-03-09 RX ORDER — SODIUM CHLORIDE 9 MG/ML
1500 INJECTION INTRAMUSCULAR; INTRAVENOUS; SUBCUTANEOUS ONCE
Refills: 0 | Status: COMPLETED | OUTPATIENT
Start: 2023-03-09 | End: 2023-03-09

## 2023-03-09 RX ORDER — OXYCODONE HYDROCHLORIDE 5 MG/1
10 TABLET ORAL EVERY 6 HOURS
Refills: 0 | Status: DISCONTINUED | OUTPATIENT
Start: 2023-03-09 | End: 2023-03-10

## 2023-03-09 RX ORDER — ALPRAZOLAM 0.25 MG
0.25 TABLET ORAL ONCE
Refills: 0 | Status: DISCONTINUED | OUTPATIENT
Start: 2023-03-09 | End: 2023-03-09

## 2023-03-09 RX ORDER — FENTANYL CITRATE 50 UG/ML
50 INJECTION INTRAVENOUS ONCE
Refills: 0 | Status: DISCONTINUED | OUTPATIENT
Start: 2023-03-09 | End: 2023-03-09

## 2023-03-09 RX ORDER — AMPICILLIN SODIUM AND SULBACTAM SODIUM 250; 125 MG/ML; MG/ML
INJECTION, POWDER, FOR SUSPENSION INTRAMUSCULAR; INTRAVENOUS
Refills: 0 | Status: DISCONTINUED | OUTPATIENT
Start: 2023-03-09 | End: 2023-03-09

## 2023-03-09 RX ORDER — HYDROMORPHONE HYDROCHLORIDE 2 MG/ML
0.5 INJECTION INTRAMUSCULAR; INTRAVENOUS; SUBCUTANEOUS ONCE
Refills: 0 | Status: DISCONTINUED | OUTPATIENT
Start: 2023-03-09 | End: 2023-03-09

## 2023-03-09 RX ORDER — ACETAMINOPHEN 500 MG
650 TABLET ORAL EVERY 6 HOURS
Refills: 0 | Status: DISCONTINUED | OUTPATIENT
Start: 2023-03-09 | End: 2023-03-09

## 2023-03-09 RX ORDER — AMPICILLIN SODIUM AND SULBACTAM SODIUM 250; 125 MG/ML; MG/ML
3 INJECTION, POWDER, FOR SUSPENSION INTRAMUSCULAR; INTRAVENOUS EVERY 6 HOURS
Refills: 0 | Status: DISCONTINUED | OUTPATIENT
Start: 2023-03-10 | End: 2023-03-09

## 2023-03-09 RX ORDER — ALBUTEROL 90 UG/1
2 AEROSOL, METERED ORAL EVERY 6 HOURS
Refills: 0 | Status: DISCONTINUED | OUTPATIENT
Start: 2023-03-09 | End: 2023-03-10

## 2023-03-09 RX ORDER — GABAPENTIN 400 MG/1
200 CAPSULE ORAL THREE TIMES A DAY
Refills: 0 | Status: DISCONTINUED | OUTPATIENT
Start: 2023-03-09 | End: 2023-03-09

## 2023-03-09 RX ORDER — ACETAMINOPHEN 500 MG
1000 TABLET ORAL ONCE
Refills: 0 | Status: COMPLETED | OUTPATIENT
Start: 2023-03-09 | End: 2023-03-09

## 2023-03-09 RX ORDER — OXYCODONE HYDROCHLORIDE 5 MG/1
5 TABLET ORAL EVERY 6 HOURS
Refills: 0 | Status: DISCONTINUED | OUTPATIENT
Start: 2023-03-09 | End: 2023-03-09

## 2023-03-09 RX ORDER — AMPICILLIN SODIUM AND SULBACTAM SODIUM 250; 125 MG/ML; MG/ML
3 INJECTION, POWDER, FOR SUSPENSION INTRAMUSCULAR; INTRAVENOUS ONCE
Refills: 0 | Status: COMPLETED | OUTPATIENT
Start: 2023-03-09 | End: 2023-03-09

## 2023-03-09 RX ORDER — OXYCODONE HYDROCHLORIDE 5 MG/1
2.5 TABLET ORAL EVERY 6 HOURS
Refills: 0 | Status: DISCONTINUED | OUTPATIENT
Start: 2023-03-09 | End: 2023-03-09

## 2023-03-09 RX ORDER — ENOXAPARIN SODIUM 100 MG/ML
40 INJECTION SUBCUTANEOUS EVERY 24 HOURS
Refills: 0 | Status: DISCONTINUED | OUTPATIENT
Start: 2023-03-09 | End: 2023-03-10

## 2023-03-09 RX ORDER — ENOXAPARIN SODIUM 100 MG/ML
40 INJECTION SUBCUTANEOUS EVERY 24 HOURS
Refills: 0 | Status: DISCONTINUED | OUTPATIENT
Start: 2023-03-09 | End: 2023-03-09

## 2023-03-09 RX ORDER — ALBUTEROL 90 UG/1
2 AEROSOL, METERED ORAL EVERY 6 HOURS
Refills: 0 | Status: DISCONTINUED | OUTPATIENT
Start: 2023-03-09 | End: 2023-03-09

## 2023-03-09 RX ORDER — OXYCODONE HYDROCHLORIDE 5 MG/1
5 TABLET ORAL EVERY 6 HOURS
Refills: 0 | Status: DISCONTINUED | OUTPATIENT
Start: 2023-03-09 | End: 2023-03-10

## 2023-03-09 RX ORDER — AMPICILLIN SODIUM AND SULBACTAM SODIUM 250; 125 MG/ML; MG/ML
1.5 INJECTION, POWDER, FOR SUSPENSION INTRAMUSCULAR; INTRAVENOUS EVERY 6 HOURS
Refills: 0 | Status: DISCONTINUED | OUTPATIENT
Start: 2023-03-09 | End: 2023-03-10

## 2023-03-09 RX ADMIN — HYDROMORPHONE HYDROCHLORIDE 0.5 MILLIGRAM(S): 2 INJECTION INTRAMUSCULAR; INTRAVENOUS; SUBCUTANEOUS at 23:28

## 2023-03-09 RX ADMIN — Medication 400 MILLIGRAM(S): at 07:25

## 2023-03-09 RX ADMIN — FENTANYL CITRATE 50 MICROGRAM(S): 50 INJECTION INTRAVENOUS at 07:23

## 2023-03-09 RX ADMIN — SODIUM CHLORIDE 1500 MILLILITER(S): 9 INJECTION INTRAMUSCULAR; INTRAVENOUS; SUBCUTANEOUS at 07:21

## 2023-03-09 RX ADMIN — Medication 0.25 MILLIGRAM(S): at 09:50

## 2023-03-09 RX ADMIN — FENTANYL CITRATE 50 MICROGRAM(S): 50 INJECTION INTRAVENOUS at 15:46

## 2023-03-09 RX ADMIN — FENTANYL CITRATE 50 MICROGRAM(S): 50 INJECTION INTRAVENOUS at 12:51

## 2023-03-09 RX ADMIN — Medication 0.25 MILLIGRAM(S): at 21:43

## 2023-03-09 RX ADMIN — FENTANYL CITRATE 50 MICROGRAM(S): 50 INJECTION INTRAVENOUS at 08:33

## 2023-03-09 RX ADMIN — AMPICILLIN SODIUM AND SULBACTAM SODIUM 200 GRAM(S): 250; 125 INJECTION, POWDER, FOR SUSPENSION INTRAMUSCULAR; INTRAVENOUS at 07:28

## 2023-03-09 RX ADMIN — HYDROMORPHONE HYDROCHLORIDE 0.5 MILLIGRAM(S): 2 INJECTION INTRAMUSCULAR; INTRAVENOUS; SUBCUTANEOUS at 22:58

## 2023-03-09 RX ADMIN — Medication 975 MILLIGRAM(S): at 21:43

## 2023-03-09 RX ADMIN — AMPICILLIN SODIUM AND SULBACTAM SODIUM 200 GRAM(S): 250; 125 INJECTION, POWDER, FOR SUSPENSION INTRAMUSCULAR; INTRAVENOUS at 16:43

## 2023-03-09 RX ADMIN — OXYCODONE HYDROCHLORIDE 10 MILLIGRAM(S): 5 TABLET ORAL at 20:13

## 2023-03-09 RX ADMIN — Medication 975 MILLIGRAM(S): at 22:15

## 2023-03-09 RX ADMIN — FENTANYL CITRATE 50 MICROGRAM(S): 50 INJECTION INTRAVENOUS at 09:04

## 2023-03-09 NOTE — ED PROVIDER NOTE - OBJECTIVE STATEMENT
pt left 10 mins ago from floor bed as AMA to smoke a cigarette and make a phone call  ENT HPI  "57y Male former smoker PMH laryngeal cancer s/p CRT 2007 with recurrence s/p  tracheostomy 2012, chronic back pain, and anxiety presented to [facility] ED after discharged from [place] ED with concern for neck abscess. Patient is s/p direct laryngoscopy with biopsy on 2/28/2023. Repeat CT neck done shows Persistent posterior pharyngeal wall ulceration with presumed associated retropharyngeal/prevertebral space fistulization and collection as seen previously. The collection now extends to the right posterior to the carotid space not seen previously. Possibilities include post radiation necrosis, abscess or tumor infiltration. Soft tissue swelling involving the anterior and posterior aspect of the right neck which may be inflammatory or may represent tumor involvement. Nodular soft tissue adjacent to the tracheostomy tube within the subglottis/upper trachea. Laryngoscope exam done at the bedside with limited view due to pooling of secretions. Pathology shows squamous mucosa with squamous hyperplasia, chronic inflammation, and reactive atypia  "

## 2023-03-09 NOTE — ED PROVIDER NOTE - PHYSICAL EXAMINATION
General: non-toxic, NAD, cachectic  HEENT: NCAT, PERRL tracheostomy in place ***    Cardiac: RRR, no murmurs, 2+ peripheral pulses  Resp: scattered wheezes   Abdomen: soft, non-distended, bowel sounds present, no ttp, no rebound or guarding. no organomegaly    Extremities: ***    Skin: no rashes  Neuro: AAOx4, 5+motor, sensation grossly intact  Psych: mood and affect appropriate GENERAL: well appearing in no acute distress, non-toxic appearing  HEAD: normocephalic, atraumatic  HEENT: normal conjunctiva, oral mucosa moist, erythema and ttp of anterior right neck  CARDIAC: regular rate and rhythm, normal S1S2, no appreciable murmurs  PULM: normal breath sounds, clear to ascultation bilaterally, no rales, rhonchi, wheezing  GI: abdomen nondistended, soft, nontender  : no CVA tenderness b/l, no suprapubic tenderness  NEURO: moving all 4 extremities, no focal deficits, normal speech, AAOx3   MSK: no peripheral edema, no calf tenderness b/l  SKIN: well-perfused, extremities warm  PSYCH: appropriate mood and affect

## 2023-03-09 NOTE — H&P ADULT - NSHPPHYSICALEXAM_GEN_ALL_CORE
Gen: NAD  Skin: No rashes, bruises, or lesions  Head: Normocephalic, Atraumatic  Face: no edema, erythema, or fluctuance. Parotid glands soft without mass  Eyes: no scleral injection  Ears: No mastoid tenderness, erythema, or ear bulging  Nose: Nares bilaterally patent, no discharge  Mouth: No stridor, no drooling, no trismus.  Mucosa moist, tongue/uvula  midline, oropharynx clear  Neck: Shiley 6 CFS secured with soft collar, no inner cannula seen. Flat,  supple, no lymphadenopathy, trachea midline, no masses  Resp: breathing easily, no stridor  CV: no peripheral edema/cyanosis  GI: nondistended  Peripheral vascular: no JVD or edema  Neuro: facial nerve intact, no facial droop

## 2023-03-09 NOTE — CHART NOTE - NSCHARTNOTEFT_GEN_A_CORE
Palliative received consult for symptom management. Spoke to ENT provider who stated that patient refused to speak to them regarding their pain medications and ED team trialed IV tylenol, however "did not want to give further opioids due to hypotension".     Recommendations:  > I took the liberty to review patient's I-stop history (see below). There are many providers that seem to be prescribing him narcotics and there is no consistency of providers.   > Patient has a history of chronic pain for which he appears to use Suboxone and percocet.   > Would recommend continuing his home regimen and consulting chronic pain to manage his suboxone   > Per chart review, patient has abscess in neck, likely causing acute pain. Defer to ENT for further management of abscess   > Can continue tylenol prn mild pain, oxycodone IR 5mg q6h prn mod-severe pain.      ------------------------------------------------------------------------------------------------------------------------------------------------------------------------  This report was requested by: Agatha Green | Reference #: [id number]    Others' Prescriptions  Patient Name: Josh Ribera Date: 1965  Address: [address] [place] [address] [place] , [place] [address]Xzr: Male  Rx Written	Rx Dispensed	Drug	Quantity	Days Supply	Prescriber Name	Prescriber Tanesha #	Payment Method	Dispenser  02/28/2023	02/28/2023	oxycodone hcl (ir) 5 mg tablet	8	2	Louis Codey	[id number]	[facility]  02/05/2023	02/05/2023	oxycodone-acetaminophen  mg tab	9	3	[facility]	[id number]	[facility] #[id number]    Patient Name: Josh Ribera Date: 1965  Address: [address] [place] [address] [place], [place] [address]Kxs: Male  Rx Written	Rx Dispensed	Drug	Quantity	Days Supply	Prescriber Name	Prescriber Tanesha #	Payment Method	Dispenser  03/08/2023	03/08/2023	oxycodone-acetaminophen  mg tab	10	2	Miguel Angel Robi BLAINE	[id number]	Vega	[facility]  03/01/2023	03/01/2023	oxycodone-acetaminophen  mg tab	12	3	Marina Moncada NP	[id number]	Vega	[facility]  02/24/2023	02/25/2023	oxycodone-acetaminophen  mg tab	8	2	Lindsey Em	[id number]	Vega	[facility]  02/23/2023	02/24/2023	oxycodone-acetaminophen  mg tab	4	1	Erica Osborn	[id number]	Vega	[facility]  02/22/2023	02/22/2023	oxycodone-acetaminophen 5-325 mg tab	12	3	Caprice Ferreira	[id number]	Vega	[facility]  02/05/2023	02/08/2023	oxycodone-acetaminophen  mg tab	9	2	[facility]	[id number]	Vega	[facility]  02/02/2023	02/03/2023	buprenorphine-naloxone 8-2 mg sl film	90	30	Nasreen Garcia	[id number]	Insurance	[facility]  01/31/2023	02/01/2023	oxycodone-acetaminophen  mg tab	10	2	Maikel Whiteside)	[id number]	Cash	[facility]  01/13/2023	01/13/2023	oxycodone-acetaminophen 5-325 mg tab	10	3	Kath Dorado	[id number]	Cash	[facility]  01/05/2023	01/05/2023	alprazolam 0.5 mg tablet	9	3	Jessica Wilson	[id number]	Insurance	[facility]  01/03/2023	01/03/2023	buprenorphine-naloxone 8-2 mg sl film	90	30	oJsh Strickland	[id number]	Insurance	[facility]  11/28/2022	11/29/2022	suboxone 8 mg-2 mg sl film	90	30	Cockren, Asheley	[id number]	Insurance	[facility]  10/31/2022	11/02/2022	suboxone 8 mg-2 mg sl film	90	30	Gelling, Nasreen	[id number]	Insurance	[facility]  10/28/2022	10/31/2022	tramadol hcl 50 mg tablet	20	5	Echo Mejia	[id number]	Vega	[facility]  10/27/2022	10/28/2022	suboxone 8 mg-2 mg sl film	17	7	Daphne Riggs	[id number]	Insurance	[facility]  10/20/2022	10/21/2022	oxycodone-acetaminophen 5-325 mg tab	10	2	Angela Catherine	[id number]	Vega	[facility]  10/19/2022	10/19/2022	oxycodone-acetaminophen 5-325 mg tab	8	2	Jessica Wilson	[id number]	Cash	[facility]  10/12/2022	10/12/2022	oxycodone hcl (ir) 5 mg tablet	15	4	Mervin Coreas MD	[id number]	Vega	[facility]  09/22/2022	09/22/2022	buprenorphine-naloxone 8-2 mg sl film	83	30	Renetta Neves	[id number]	Insurance	[facility]  08/31/2022	08/31/2022	alprazolam 0.5 mg tablet	9	3	Indigo Pink	[id number]	Insurance	[facility]  08/31/2022	08/31/2022	oxycodone hcl (ir) 5 mg tablet	20	5	Liana Hannon	[id number]	Vega	[facility]  08/25/2022	08/25/2022	suboxone 8 mg-2 mg sl film	83	30	Gelling, Nasreen	[id number]	Insurance	[facility]  08/16/2022	08/16/2022	alprazolam 0.5 mg tablet	9	3	Heber Crowe MD	[id number]	Insurance	[facility]  07/28/2022	07/28/2022	suboxone 8 mg-2 mg sl film	75	30	Gelling, Nasreen	[id number]	Insurance	[facility]  07/15/2022	07/15/2022	alprazolam 0.5 mg tablet	75	25	Yann Proctor L	[id number]	Insurance	[facility]  07/01/2022	07/01/2022	suboxone 8 mg-2 mg sl film	75	30	Jennifer Blevins	[id number]	Insurance	[facility]  06/20/2022	06/21/2022	suboxone 8 mg-2 mg sl film	35	14	Nasreen Garcia	[id number]	Insurance	[facility]  06/20/2022	06/21/2022	alprazolam 0.5 mg tablet	38	13	[facility] Behrouz DO	[id number]	Insurance	[facility]  06/06/2022	06/06/2022	suboxone 8 mg-2 mg sl film	35	14	Gladis Huizar	[id number]	Insurance	[facility]  05/23/2022	05/23/2022	suboxone 8 mg-2 mg sl film	35	14	Talha Brand	[id number]	Insurance	[facility]  05/23/2022	05/23/2022	alprazolam 0.5 mg tablet	81	27	[facility]hr[facility]	[id number]	Insurance	[facility]  05/09/2022	05/10/2022	alprazolam 0.5 mg tablet	38	13	Farmandpour, Behrouz DO	[id number]	Insurance	[facility]  05/09/2022	05/09/2022	buprenorphine-naloxone 8-2 mg sl film	35	14	Talha Brand	[id number]	Insurance	[facility]  04/25/2022	04/27/2022	buprenorphine-naloxone 8-2 mg sl film	35	14	Talha Brand	[id number]	Insurance	[facility]  04/25/2022	04/27/2022	alprazolam 0.5 mg tablet	38	13	Farmandpour, Behrouz DO	[id number]	Insurance	[facility]  04/12/2022	04/12/2022	oxycodone-acetaminophen 5-325 mg tab	10	3	Heber Crowe MD	[id number]	[facility]  04/01/2022	04/01/2022	buprenorphine-naloxone 8-2 mg sl film	60	30	Jennifer Blevins	[id number]	Insurance	[facility]  03/28/2022	03/28/2022	buprenorphine-naloxone 8-2 mg sl film	8	4	Talha Brand	[id number]	Insurance	[facility]  03/28/2022	03/28/2022	alprazolam 0.5 mg tablet	75	25	Farahmandpour, Behrouz DO	[id number]	Insurance	[facility]  03/25/2022	03/25/2022	diazepam 5 mg tablet	10	4	Maikel Whiteside ([place])	[id number]	[facility]

## 2023-03-09 NOTE — ED ADULT NURSE NOTE - NSICDXPASTMEDICALHX_GEN_ALL_CORE_FT
PAST MEDICAL HISTORY:  Accidental Fall with lumbar spine injury in 2000    Anxiety     Family history of chemotherapy [phone]    History of Laryngeal Cancer tx with radiation and chemo    History of radiation therapy [phone]    Malignant neoplasm of larynx     Sciatica of right side     Voice Disturbance

## 2023-03-09 NOTE — ED ADULT NURSE NOTE - OBJECTIVE STATEMENT
Pt received to room 24 a/o x 3 c/o throat/neck pain x 3 days. Pt was seen at OSH and was told he had a abscess that needed to be drained.  Pt states pain worse with movement and turning his head. Pt has a tracheostomy in place, is a 6 shiley. Respirations even and unlabored. Lung sounds clear with equal chest rise bilaterally. ABD is soft, non tender, non distended with normal active bowel sounds No complaints of chest pain, headache, nausea, dizziness, vomiting, fever, chills verbalized.  pt has 20g to left and right AC. labs drawn and sent. medications given as per order.

## 2023-03-09 NOTE — H&P ADULT - NSICDXPASTMEDICALHX_GEN_ALL_CORE_FT
PAST MEDICAL HISTORY:  Accidental Fall with lumbar spine injury in 2000    Anxiety     Family history of chemotherapy [id number]    History of Laryngeal Cancer tx with radiation and chemo    History of radiation therapy [phone]    Malignant neoplasm of larynx     Sciatica of right side     Voice Disturbance

## 2023-03-09 NOTE — ED PROVIDER NOTE - CLINICAL SUMMARY MEDICAL DECISION MAKING FREE TEXT BOX
57-year-old male with a history of COPD, laryngeal cancer s/p laryngectomy and tracheostomy tube presents with right anterior neck pain and sinus pain.  Patient was seen at [facility] and there was some concern for abscess around tracheostomy.  Transferred to [facility] for ENT.  Labs, repeat imaging ordered.

## 2023-03-09 NOTE — H&P ADULT - NSHPPHYSICALEXAM_GEN_ALL_CORE
PHYSICAL EXAM:  Gen: NAD  Skin: No rashes, bruises, or lesions  Head: Normocephalic, Atraumatic  Face: no edema, erythema, or fluctuance. Parotid glands soft without mass  Eyes: no scleral injection  Ears: No mastoid tenderness, erythema, or ear bulging  Nose: Nares bilaterally patent, no discharge  Mouth: No stridor, no drooling, no trismus.  Mucosa moist, tongue/uvula midline, oropharynx clear  Neck: Shiley 6 CFS secured with soft collar, no inner cannula seen. Flat, supple, no lymphadenopathy, trachea midline, no masses  Resp: breathing easily, no stridor  CV: no peripheral edema/cyanosis  GI: nondistended   Peripheral vascular: no JVD or edema  Neuro: facial nerve intact, no facial droop    Tracheoscopy (Ambu scope used):  Scope advanced through trach tube. Trach tube in proper position, centered in trachea. Carinal bifurcation visualized.     Flexible laryngoscopy was performed and revealed the following:  limited view due to pooling of secretions.

## 2023-03-09 NOTE — H&P ADULT - HISTORY OF PRESENT ILLNESS
57y Male former smoker PMH laryngeal cancer s/p CRT 2007 with recurrence s/p  tracheostomy 2012, chronic back pain, and anxiety presented to [facility] ED for neck abscess. Patient was seen in [place] last night for pain and discomfort, and was told he had a abscess in the neck and needs to be drained. He also c/o being very hungry and in a lot of pain. Denies fever, chills, lightheadedness, palpitations, nausea and vomiting.

## 2023-03-09 NOTE — H&P ADULT - ASSESSMENT
57y Male former smoker [facility] laryngeal cancer s/p CRT 2007 with recurrence s/p  tracheostomy 2012, chronic back pain, and anxiety presented to [facility] ED after  discharged from [place] ED with concern for neck abscess. Patient is s/p  direct laryngoscopy with biopsy on 2/28/2023. Repeat CT neck done shows  Persistent posterior pharyngeal wall ulceration with presumed associated  retropharyngeal/prevertebral space fistulization and collection as seen  previously. The collection now extends to the right posterior to the carotid  space not seen previously. Possibilities include post radiation necrosis,  abscess or tumor infiltration. Soft tissue swelling involving the anterior and  posterior aspect of the right neck which may be inflammatory or may represent  tumor involvement. Nodular soft tissue adjacent to the tracheostomy tube within  the subglottis/upper trachea. Laryngoscope exam done at the bedside with  limited view due to pooling of secretions. Pathology shows squamous mucosa with  squamous hyperplasia, chronic inflammation, and reactive atypia    - patient left AMA from the hospital earlier in the day, now wishes to continue treatment   - Plan: - Admit to ENT under Dr. New  - Start Unasyn  - Resume home diet  - Palliative cancer pain consult (called)  - MR of neck with and without IV (ordered)  - Case discussed with Dr. New.

## 2023-03-09 NOTE — ED ADULT NURSE NOTE - NSICDXPASTMEDICALHX_GEN_ALL_CORE_FT
PAST MEDICAL HISTORY:  Accidental Fall with lumbar spine injury in 2000    Anxiety     Family history of chemotherapy [phone]    History of Laryngeal Cancer tx with radiation and chemo    History of radiation therapy [id number]    Malignant neoplasm of larynx     Sciatica of right side     Voice Disturbance

## 2023-03-09 NOTE — ED ADULT NURSE NOTE - OBJECTIVE STATEMENT
patient a/ox4, ambulatory, AMA'd from floor bed to smoke cigarette. patient here for neck abscess. New bed assignment pending. Patient offers no complaints at this time.

## 2023-03-09 NOTE — ED ADULT TRIAGE NOTE - CHIEF COMPLAINT QUOTE
Pt with tracheostomy, comes in c/o abscess in his neck which was seen in a CT scan Yesterday. He was told to come to ER to drain the abscess. c/o difficulty swallowing and pain. Low Bp in triage. Pt with tracheostomy, comes in c/o abscess in his neck which was seen in a CT scan Yesterday. He was told to come to ER to drain the abscess. c/o difficulty swallowing and pain. Low Bp in triage. PMH of laryngeal cancer, spinal surgery , Pt with tracheostomy, comes in c/o abscess in his neck which was seen in a CT scan Yesterday. He was told to come to ER to drain the abscess. c/o difficulty swallowing and pain. Low Bp in triage. denies CP or SOB or dizziness. Pt is ambulatory. PMH of laryngeal cancer, spinal surgery ,

## 2023-03-09 NOTE — H&P ADULT - HISTORY OF PRESENT ILLNESS
57y Male former smoker [facility] laryngeal cancer s/p CRT 2007 with recurrence s/p  tracheostomy 2012, chronic back pain, and anxiety presented to [facility] ED for neck  abscess. Patient was seen in [place] last night for pain and  discomfort, and was told he had a abscess in the neck and needs to be drained.  He also c/o being very hungry and in a lot of pain. Denies fever, chills,  lightheadedness, palpitations, nausea and vomiting.    Patient left AMA earlier from the hospital, now has returned and wishes to continue care. Explained to patient hat it may take a day or so to get the MRI and he states he will remain patient

## 2023-03-09 NOTE — H&P ADULT - NSICDXPASTMEDICALHX_GEN_ALL_CORE_FT
PAST MEDICAL HISTORY:  Accidental Fall with lumbar spine injury in 2000    Anxiety     Family history of chemotherapy [phone]    History of Laryngeal Cancer tx with radiation and chemo    History of radiation therapy [id number]    Malignant neoplasm of larynx     Sciatica of right side     Voice Disturbance

## 2023-03-09 NOTE — ED ADULT NURSE NOTE - CHIEF COMPLAINT QUOTE
Pt with tracheostomy, comes in c/o abscess in his neck which was seen in a CT scan Yesterday. He was told to come to ER to drain the abscess. c/o difficulty swallowing and pain. Low Bp in triage. denies CP or SOB or dizziness. Pt is ambulatory. PMH of laryngeal cancer, spinal surgery ,

## 2023-03-09 NOTE — ED PROVIDER NOTE - CLINICAL SUMMARY MEDICAL DECISION MAKING FREE TEXT BOX
as note states pt went outside to smoke cigarette; spoke with charge nurse and ENT; to be readmitted to ENT

## 2023-03-09 NOTE — ED PROVIDER NOTE - ATTENDING CONTRIBUTION TO CARE
Attending Statement: I have personally seen and examined this patient. I have fully participated in the care of this patient. I have reviewed all pertinent clinical information, including history physical exam, plan and the Resident's note and agree except as noted  57-year-old male history of COPD, history of laryngeal CA with recurrence status post tracheostomy presents with right-sided neck pain x2 weeks.  Gradually worsening.  States it hurts when he turns his.  Denies fever or chills.  Denies nausea vomiting.  He has some pain with swallowing and over the last 2 weeks he has had intermittent episodes of hemoptysis none today.  Not on anticoagulation.  Patient went to [place] had a CT of the chest and was told to have "an abscess however on the renal cortical cellulitis, was discharged on Augmentin which patient has not been able to take.  Pain persisted came to the ER for evaluation.  Vitals are noted.  Blood pressure 103/77 heart rate 5600% room air chronically ill male laying in bed with a trach in place swelling redness and tenderness to the trach especially on the right side no respiratory distress able to answer questions in full sentences.  Plan sepsis labs, CT neck with IV contrast rule out abscess, antibiotics, pain meds ENT evaluation and admission

## 2023-03-09 NOTE — ED PROVIDER NOTE - OBJECTIVE STATEMENT
57-year-old male with a history of COPD laryngeal cancer with recurrence now status post tracheostomy since 2012 presents with known right-sided neck cellulitis adjacent to his tracheostomy that has been causing him discomfort for the past 1 to 2 weeks.  Associated with difficulty swallowing, intermittent bright red hemoptysis, 6 pounds of weight loss, mild shortness of breath and difficulty moving his neck.  Denies fevers chills lightheadedness, palpitations nausea vomiting.  Feels like his voice is more hoarse than usual.  Was seen at [place] yesterday where he had a CT showing the cellulitis, which he brought records of. Was told he had an abscess that need to be drained.

## 2023-03-09 NOTE — ED ADULT NURSE REASSESSMENT NOTE - NS ED NURSE REASSESS COMMENT FT1
Patient resting in stretcher, no acute distress noted at this time. Patient updated on plan of care, will continue to monitor.
Pt c/o pain, requesting pain meds. MD Sams, Annelise aware. Awaiting orders.
Received patient from previous RN, A&O X4 , documentation as noted. Patient complains of throat pain at this time, patient to be medicated as ordered. Patient has shiley trache noted at this time, respirations equal and unlabored on RA. Patient pending ENT eval. Patient in no acute distress at this time, will continue to monitor. VSS as noted in flowsheet.
Patient resting in stretcher, no acute distress noted at this time. Patient updated on plan of care, patient complaining of throat pain rating 9 out of 10, Resident Dot made aware. Will continue to monitor.

## 2023-03-09 NOTE — H&P ADULT - ASSESSMENT
57y Male former smoker PMH laryngeal cancer s/p CRT 2007 with recurrence s/p  tracheostomy 2012, chronic back pain, and anxiety presented to [facility] ED after discharged from [place] ED with concern for neck abscess. Patient is s/p direct laryngoscopy with biopsy on 2/28/2023. Repeat CT neck done shows Persistent posterior pharyngeal wall ulceration with presumed associated retropharyngeal/prevertebral space fistulization and collection as seen previously. The collection now extends to the right posterior to the carotid space not seen previously. Possibilities include post radiation necrosis, abscess or tumor infiltration. Soft tissue swelling involving the anterior and posterior aspect of the right neck which may be inflammatory or may represent tumor involvement. Nodular soft tissue adjacent to the tracheostomy tube within the subglottis/upper trachea. Laryngoscope exam done at the bedside with limited view due to pooling of secretions. Pathology shows squamous mucosa with squamous hyperplasia, chronic inflammation, and reactive atypia

## 2023-03-09 NOTE — ED PROVIDER NOTE - NS ED ROS FT
Constitutional: no fevers, chills  HEENT: no HA, vision changes, rhinorrhea, +neck pain  Cardiac: no chest pain, palpitations  Respiratory: +SOB, cough hemoptysis  GI: no n/v/d/c, abd pain, bloody or dark stools  : no dysuria, frequency, or hematuria  MSK: no joint pain, +neck pain +back pain  Skin:   Neuro: +numbness/tingling R UE new since cellulitis, as well as chronic L UE numbness/tingling s/p cervical fusion surgeries. no weakness or unsteady gait  ROS otherwise negative except per HPI

## 2023-03-09 NOTE — ED PROVIDER NOTE - NSICDXPASTMEDICALHX_GEN_ALL_CORE_FT
PAST MEDICAL HISTORY:  Accidental Fall with lumbar spine injury in 2000    Anxiety     Family history of chemotherapy [phone]    History of Laryngeal Cancer tx with radiation and chemo    History of radiation therapy [id number]    Malignant neoplasm of larynx     Sciatica of right side     Voice Disturbance

## 2023-03-09 NOTE — CHART NOTE - NSCHARTNOTEFT_GEN_A_CORE
During admission, patient demanded to leave hospital room in order to smoke cigarette. Patient told that he is not permitted to leave the hospital to smoke, but demanded to be released against medical advice. The risks, benefits, and alternatives to leaving against medical advise were discussed with the patient - these risks include but are not limited to death. He accepted these risks and left the hospital.

## 2023-03-09 NOTE — ED ADULT NURSE REASSESSMENT NOTE - NS ED NURSE REASSESS COMMENT FT1
No initial nurse note done, report given to LUCY Baker. Patient A&Ox4 and ambulatory at baseline. Respirations even and unlabored, no signs/symptoms of acute distress. Patient able to speak in complete, uninterrupted sentences. Patient offers no complaints at this time. Patient awaiting transport; 12 cigarettes and 1 lighter removed from person.

## 2023-03-09 NOTE — ED PROVIDER NOTE - PROGRESS NOTE DETAILS
Patient states "I am bugging out" states he takes Xanax 0.25 twice a day requesting some Xanax.  Explained again that he is pending CT to be done and read before he is allowed to eat in case he needs a procedure. Patient stable requesting more pain meds.  Presenting ENT recommendations. ENT resident will see pt soon ENT will admit, most likely tumor recurrence

## 2023-03-09 NOTE — ED ADULT TRIAGE NOTE - NS ED TRIAGE AVPU SCALE
Alert-The patient is alert, awake and responds to voice. The patient is oriented to time, place, and person. The triage nurse is able to obtain subjective information. n/a

## 2023-03-09 NOTE — ED PROVIDER NOTE - NSICDXPASTMEDICALHX_GEN_ALL_CORE_FT
PAST MEDICAL HISTORY:  Accidental Fall with lumbar spine injury in 2000    Anxiety     Family history of chemotherapy 4580-0212    History of Laryngeal Cancer tx with radiation and chemo    History of radiation therapy [phone]    Malignant neoplasm of larynx     Sciatica of right side     Voice Disturbance

## 2023-03-09 NOTE — ED ADULT TRIAGE NOTE - CHIEF COMPLAINT QUOTE
Pt c/o neck abscess, told to come to ED by Dr. Mendell. Pt AMA from hospital today, returning for same symptoms. Pt has trach to room air. Denies difficulty breathing.

## 2023-03-09 NOTE — H&P ADULT - PROBLEM SELECTOR PLAN 1
- Admit to ENT under Dr. New  - Start Unasyn   - Resume home diet  - Palliative cancer pain consult (called)  - COVID-19 PCR  - MR of neck with and without IV (ordered)  - Case discussed with Dr. New

## 2023-03-10 VITALS
TEMPERATURE: 98 F | DIASTOLIC BLOOD PRESSURE: 76 MMHG | SYSTOLIC BLOOD PRESSURE: 128 MMHG | RESPIRATION RATE: 18 BRPM | HEART RATE: 70 BPM | OXYGEN SATURATION: 100 %

## 2023-03-10 DIAGNOSIS — Z51.5 ENCOUNTER FOR PALLIATIVE CARE: ICD-10-CM

## 2023-03-10 DIAGNOSIS — Z85.21 PERSONAL HISTORY OF MALIGNANT NEOPLASM OF LARYNX: ICD-10-CM

## 2023-03-10 DIAGNOSIS — R52 PAIN, UNSPECIFIED: ICD-10-CM

## 2023-03-10 DIAGNOSIS — F41.9 ANXIETY DISORDER, UNSPECIFIED: ICD-10-CM

## 2023-03-10 LAB
CULTURE RESULTS: SIGNIFICANT CHANGE UP
SPECIMEN SOURCE: SIGNIFICANT CHANGE UP

## 2023-03-10 PROCEDURE — 99223 1ST HOSP IP/OBS HIGH 75: CPT

## 2023-03-10 PROCEDURE — 90792 PSYCH DIAG EVAL W/MED SRVCS: CPT

## 2023-03-10 RX ORDER — SENNA PLUS 8.6 MG/1
2 TABLET ORAL AT BEDTIME
Refills: 0 | Status: DISCONTINUED | OUTPATIENT
Start: 2023-03-10 | End: 2023-03-10

## 2023-03-10 RX ORDER — ALPRAZOLAM 0.25 MG
0.5 TABLET ORAL ONCE
Refills: 0 | Status: DISCONTINUED | OUTPATIENT
Start: 2023-03-10 | End: 2023-03-10

## 2023-03-10 RX ORDER — POLYETHYLENE GLYCOL 3350 17 G/17G
17 POWDER, FOR SOLUTION ORAL DAILY
Refills: 0 | Status: DISCONTINUED | OUTPATIENT
Start: 2023-03-10 | End: 2023-03-10

## 2023-03-10 RX ORDER — HYDROXYZINE HCL 10 MG
50 TABLET ORAL
Refills: 0 | Status: DISCONTINUED | OUTPATIENT
Start: 2023-03-10 | End: 2023-03-10

## 2023-03-10 RX ORDER — INFLUENZA VIRUS VACCINE 15; 15; 15; 15 UG/.5ML; UG/.5ML; UG/.5ML; UG/.5ML
0.5 SUSPENSION INTRAMUSCULAR ONCE
Refills: 0 | Status: DISCONTINUED | OUTPATIENT
Start: 2023-03-10 | End: 2023-03-10

## 2023-03-10 RX ADMIN — ENOXAPARIN SODIUM 40 MILLIGRAM(S): 100 INJECTION SUBCUTANEOUS at 07:09

## 2023-03-10 RX ADMIN — AMPICILLIN SODIUM AND SULBACTAM SODIUM 100 GRAM(S): 250; 125 INJECTION, POWDER, FOR SUSPENSION INTRAMUSCULAR; INTRAVENOUS at 01:04

## 2023-03-10 RX ADMIN — AMPICILLIN SODIUM AND SULBACTAM SODIUM 100 GRAM(S): 250; 125 INJECTION, POWDER, FOR SUSPENSION INTRAMUSCULAR; INTRAVENOUS at 07:10

## 2023-03-10 RX ADMIN — OXYCODONE HYDROCHLORIDE 5 MILLIGRAM(S): 5 TABLET ORAL at 07:09

## 2023-03-10 RX ADMIN — Medication 0.5 MILLIGRAM(S): at 09:56

## 2023-03-10 NOTE — BH CONSULTATION LIAISON ASSESSMENT NOTE - CURRENT MEDICATION
MEDICATIONS  (STANDING):  ampicillin/sulbactam  IVPB 1.5 Gram(s) IV Intermittent every 6 hours  enoxaparin Injectable 40 milliGRAM(s) SubCutaneous every 24 hours  influenza   Vaccine 0.5 milliLiter(s) IntraMuscular once  polyethylene glycol 3350 17 Gram(s) Oral daily  senna 2 Tablet(s) Oral at bedtime    MEDICATIONS  (PRN):  acetaminophen   Oral Liquid .. 975 milliGRAM(s) Oral every 6 hours PRN Mild Pain (1 - 3)  albuterol    90 MICROgram(s) HFA Inhaler 2 Puff(s) Inhalation every 6 hours PRN Shortness of Breath and/or Wheezing  oxyCODONE    IR 5 milliGRAM(s) Oral every 6 hours PRN Moderate Pain (4 - 6)  oxyCODONE    IR 10 milliGRAM(s) Oral every 6 hours PRN Severe Pain (7 - 10)

## 2023-03-10 NOTE — ED STATDOCS - NS ED MD EM SELECTION
65918 Exp Problem Focused - Mod. Complex Complex Repair And A-T Advancement Flap Text: The defect edges were debeveled with a #15 scalpel blade.  The primary defect was closed partially with a complex linear closure.  Given the location of the remaining defect, shape of the defect and the proximity to free margins an A-T advancement flap was deemed most appropriate for complete closure of the defect.  Using a sterile surgical marker, an appropriate advancement flap was drawn incorporating the defect and placing the expected incisions within the relaxed skin tension lines where possible.    The area thus outlined was incised deep to adipose tissue with a #15 scalpel blade.  The skin margins were undermined to an appropriate distance in all directions utilizing iris scissors.

## 2023-03-10 NOTE — BH CONSULTATION LIAISON ASSESSMENT NOTE - SUMMARY
Recommendations:  - avoid Benzos given potential history of med seeking behavior, receiving opiates, and refusal to participate in interview and give substance history --> additionally xanax has short half life and has high abuse potential  - PRN: Atarax 50mg QID for anxiety  - Agitation: get EKG, if QTc<500 -->     DISPO: Home   57y Male former smoker [facility] laryngeal cancer s/p CRT 2007 with recurrence s/p  tracheostomy 2012, chronic back pain, and anxiety presented to [facility] ED for neck abscess. Patient was seen in [place] last night for pain and discomfort, and was told he had a abscess in the neck and needs to be drained. Of note patient left AMA earlier yesterday but returned. Unclear psych history.    On assessment, unable to complete full psychiatric assessment as patient refusing to cooperate with interview, stating he does not want or need psychiatry. Patient states he is gets anxious at night due to being in the hospital, and requesting benzos for anxiety. Patient denies any physical symptoms related to anxiety. Patient denies any depressed mood and quickly denies suicidal ideation but unable to do full assessment as patient declined to discuss further with team. Of note patient has been charted to have a history of potential med-seeking behavior and patient's ISTOP shows that patient has been previously prescribed short durations of Oxycodone and suboxone in the past. Has also been prescribed Xanax.    Recommendations:  - avoid Benzos given potential history of med seeking behavior, receiving opiates, and refusal to participate in interview and give substance history --> additionally xanax has short half life and has high abuse potential  - PRN: Atarax 50mg QID for anxiety  - Agitation: get EKG, if QTc<500 -->     DISPO: Home   57y Male former smoker [facility] laryngeal cancer s/p CRT 2007 with recurrence s/p  tracheostomy 2012, chronic back pain, and anxiety presented to [facility] ED for neck abscess. Patient was seen in [place] last night for pain and discomfort, and was told he had a abscess in the neck and needs to be drained. Of note patient left AMA earlier yesterday but returned. Unclear psych history.    On assessment, unable to complete full psychiatric assessment as patient refusing to cooperate with interview, stating he does not want or need psychiatry. Patient states he is gets anxious at night due to being in the hospital, and requesting benzos for anxiety. Patient denies any physical symptoms related to anxiety. Patient denies any depressed mood and quickly denies suicidal ideation but unable to do full assessment as patient declined to discuss further with team. Of note patient has been charted to have a history of potential med-seeking behavior and patient's ISTOP shows that patient has been previously prescribed short durations of Oxycodone and suboxone in the past. Has also been prescribed Xanax.    Recommendations:  - avoid Benzos given potential history of med seeking behavior, receiving opiates, and refusal to participate in interview and give substance history --> additionally xanax has short half life and has high abuse potential  - PRN: Atarax 50mg QID for anxiety  - Agitation: get EKG, if QTc<500 --> Haldol 5mg q6H    DISPO: Home   57y Male former smoker [facility] laryngeal cancer s/p CRT 2007 with recurrence s/p  tracheostomy 2012, chronic back pain, and anxiety presented to [facility] ED for neck abscess. Patient was seen in [place] last night for pain and discomfort, and was told he had a abscess in the neck and needs to be drained. Of note patient left AMA earlier yesterday but returned. Unclear psych history.    On assessment, unable to complete full psychiatric assessment as patient refusing to cooperate with interview, stating he does not want or need psychiatry. Patient states he is gets anxious at night due to being in the hospital, and requesting benzos for anxiety. Patient denies any physical symptoms related to anxiety. Patient denies any depressed mood and quickly denies suicidal ideation but unable to do full assessment as patient declined to discuss further with team. Of note patient has been charted to have a history of potential med-seeking behavior and patient's ISTOP shows that patient has been previously prescribed short durations of Oxycodone and suboxone in the past. Has also been prescribed Xanax.    Recommendations:  - avoid Benzos given potential history of med seeking behavior, receiving opiates, and refusal to participate in interview and give substance history --> additionally xanax has short half life and has high abuse potential  - Nicotine patch 21mg qd + gum 4mg q2h PNR  - PRN: Atarax 50mg QID for anxiety  - Agitation: get EKG, if QTc<500 --> Haldol 5mg q6H    DISPO: Home

## 2023-03-10 NOTE — CONSULT NOTE ADULT - PROBLEM SELECTOR RECOMMENDATION 4
Would recommend chronic pain consult if he gets re-admitted.     Agatha Green D.O.   Palliative Medicine

## 2023-03-10 NOTE — CONSULT NOTE ADULT - PROBLEM SELECTOR RECOMMENDATION 2
PATIENT VERY FRUSTRATED about having to wait for MRI for 2 days already. States he takes xanax 0.5mg tid at home. Per I-stop, he got 3 day supply 1/5/23. Unclear if he f/u with anyone   > Behavioral health note reviewed

## 2023-03-10 NOTE — BH CONSULTATION LIAISON ASSESSMENT NOTE - NSICDXPASTMEDICALHX_GEN_ALL_CORE_FT
PAST MEDICAL HISTORY:  Accidental Fall with lumbar spine injury in 2000    Anxiety     Family history of chemotherapy [id number]    History of Laryngeal Cancer tx with radiation and chemo    History of radiation therapy [id number]    Malignant neoplasm of larynx     Sciatica of right side     Voice Disturbance

## 2023-03-10 NOTE — BH CONSULTATION LIAISON ASSESSMENT NOTE - NSBHCHARTREVIEWVS_PSY_A_CORE FT
Vital Signs Last 24 Hrs  T(C): 36.7 (10 Mar 2023 10:16), Max: 37 (09 Mar 2023 21:18)  T(F): 98.1 (10 Mar 2023 10:16), Max: 98.6 (09 Mar 2023 21:18)  HR: 54 (10 Mar 2023 10:16) (52 - 79)  BP: 109/84 (10 Mar 2023 10:16) (104/64 - 141/63)  BP(mean): --  RR: 18 (10 Mar 2023 10:16) (18 - 20)  SpO2: 100% (10 Mar 2023 10:16) (97% - 100%)    Parameters below as of 10 Mar 2023 10:16  Patient On (Oxygen Delivery Method): room air

## 2023-03-10 NOTE — CONSULT NOTE ADULT - ASSESSMENT
57y Male former smoker [facility] laryngeal cancer s/p CRT 2007 with recurrence s/p tracheostomy 2012, chronic back pain, and anxiety presented to [facility] ED after discharged from [place] ED with concern for neck abscess. Patient is s/p direct laryngoscopy with biopsy on 2/28/2023. Repeat CT neck done shows Persistent posterior pharyngeal wall ulceration with presumed associatedretropharyngeal/prevertebral space fistulization and collection as seenpreviously. The collection now extends to the right posterior to the carotid space not seen previously. Possibilities include post radiation necrosis,  abscess or tumor infiltration. Soft tissue swelling involving the anterior andposterior aspect of the right neck which may be inflammatory or may represent tumor involvement. Nodular soft tissue adjacent to the tracheostomy tube within  the subglottis/upper trachea. Laryngoscope exam done at the bedside with limited view due to pooling of secretions. Pathology shows squamous mucosa with squamous hyperplasia, chronic inflammation, and reactive atypia    Palliative consulted for symptom management.

## 2023-03-10 NOTE — CONSULT NOTE ADULT - PROBLEM SELECTOR RECOMMENDATION 3
PMH laryngeal cancer s/p CRT 2007 with recurrence s/p tracheostomy 2012   > Not on active DMT for the past 10 years   > pending MRI neck   > ENT recs

## 2023-03-10 NOTE — CHART NOTE - NSCHARTNOTEFT_GEN_A_CORE
ISTOP Records  3/10/23    Others' Prescriptions  Patient Name: Josh NavarreteBirth Date: 1965  Address: [address] [place] [address] [address] [place], [place] [address]Gsf: Male  Rx Written	Rx Dispensed	Drug	Quantity	Days Supply	Prescriber Name	Prescriber Tanesha #	Payment Method	Dispenser  02/28/2023	02/28/2023	oxycodone hcl (ir) 5 mg tablet	8	2	Codey Myers	[id number]	[facility]  02/05/2023	02/05/2023	oxycodone-acetaminophen  mg tab	9	3	[facility]	[id number]	[id number]    Patient Name: Josh NavarreteBirth Date: 1965  Address: [address] [place], [place] [address]Kcd: Male  Rx Written	Rx Dispensed	Drug	Quantity	Days Supply	Prescriber Name	Prescriber Tanesha #	Payment Method	Dispenser  03/08/2023	03/08/2023	oxycodone-acetaminophen  mg tab	10	2	Robi Michelle	[id number]	[facility]  03/01/2023	03/01/2023	oxycodone-acetaminophen  mg tab	12	3	Marina Moncada , NP	[id number]	[facility]  02/24/2023	02/25/2023	oxycodone-acetaminophen  mg tab	8	2	Lindsey Em	[id number]	[facility]  02/23/2023	02/24/2023	oxycodone-acetaminophen  mg tab	4	1	Erica Osborn	[id number]	[facility]  02/22/2023	02/22/2023	oxycodone-acetaminophen 5-325 mg tab	12	3	Caprice Ferreira	[id number]	[facility]  02/05/2023	02/08/2023	oxycodone-acetaminophen  mg tab	9	2	[facility]	[id number]	[facility]  02/02/2023	02/03/2023	buprenorphine-naloxone 8-2 mg sl film	90	30	Nasreen Garcia	[id number]	Insurance	[facility]  01/31/2023	02/01/2023	oxycodone-acetaminophen  mg tab	10	2	Maikel Whiteside (PA)	[id number]	Cash	[facility]  01/13/2023	01/13/2023	oxycodone-acetaminophen 5-325 mg tab	10	3	EstradaKath PA	[id number]	Cash	[facility]  01/05/2023	01/05/2023	alprazolam 0.5 mg tablet	9	3	Jessica Wilson	[id number]	Insurance	[facility]  01/03/2023	01/03/2023	buprenorphine-naloxone 8-2 mg sl film	90	30	Josh Strickland	[id number]	Insurance	[facility]  11/28/2022	11/29/2022	suboxone 8 mg-2 mg sl film	90	30	Renetta Neves	[id number]	Insurance	[facility]  10/31/2022	11/02/2022	suboxone 8 mg-2 mg sl film	90	30	JoseNasreen	[id number]	Insurance	[facility]  10/28/2022	10/31/2022	tramadol hcl 50 mg tablet	20	5	Echo Mejia	[id number]	Vega	[facility]  10/27/2022	10/28/2022	suboxone 8 mg-2 mg sl film	17	7	Daphne Riggs	[id number]	Insurance	[facility]  10/20/2022	10/21/2022	oxycodone-acetaminophen 5-325 mg tab	10	2	Angela Catherine	[id number]	Vega	[facility]  10/19/2022	10/19/2022	oxycodone-acetaminophen 5-325 mg tab	8	2	Jessica Wilson	[id number]	Cash	[facility]  10/12/2022	10/12/2022	oxycodone hcl (ir) 5 mg tablet	15	4	Mervin Coreas MD	[id number]	Vega	[facility]  09/22/2022	09/22/2022	buprenorphine-naloxone 8-2 mg sl film	83	30	Renetta Neves	[id number]	Insurance	[facility]  08/31/2022	08/31/2022	alprazolam 0.5 mg tablet	9	3	Indigo Pink	[id number]	Insurance	[facility]  08/31/2022	08/31/2022	oxycodone hcl (ir) 5 mg tablet	20	5	Liana Hannon	[id number]	Vega	[facility]  08/25/2022	08/25/2022	suboxone 8 mg-2 mg sl film	83	30	Gelling Nasreen	[id number]	Insurance	[facility]  08/16/2022	08/16/2022	alprazolam 0.5 mg tablet	9	3	Heber Crowe MD	[id number]	Insurance	[facility]  07/28/2022	07/28/2022	suboxone 8 mg-2 mg sl film	75	30	Gelabdi, Nasreen	[id number]	Insurance	[facility]  07/15/2022	07/15/2022	alprazolam 0.5 mg tablet	75	25	Yann Proctor L	[id number]	Insurance	[facility]  07/01/2022	07/01/2022	suboxone 8 mg-2 mg sl film	75	30	Jennifer Blevins	[id number]	Insurance	[facility]  06/20/2022	06/21/2022	suboxone 8 mg-2 mg sl film	35	14	Gelabdi Nasreen	[id number]	Insurance	[facility]  06/20/2022	06/21/2022	alprazolam 0.5 mg tablet	38	13	Farpadminimandpour, Behrouz DO	[id number]	Insurance	[facility]  06/06/2022	06/06/2022	suboxone 8 mg-2 mg sl film	35	14	Gladis Huizar	[id number]	Insurance	[facility]  05/23/2022	05/23/2022	suboxone 8 mg-2 mg sl film	35	14	Talha Brand	[id number]	Insurance	[facility]  05/23/2022	05/23/2022	alprazolam 0.5 mg tablet	81	27	Farahmandpour, Behrouz DO	[id number]	Insurance	[facility]  05/09/2022	05/10/2022	alprazolam 0.5 mg tablet	38	13	Farahmandpour, Behrouz DO	[id number]	Insurance	[facility]  05/09/2022	05/09/2022	buprenorphine-naloxone 8-2 mg sl film	35	14	Talha Brand	[id number]	Insurance	[facility]  04/25/2022	04/27/2022	buprenorphine-naloxone 8-2 mg sl film	35	14	Talha Brand	[id number]	Insurance	[facility]  04/25/2022	04/27/2022	alprazolam 0.5 mg tablet	38	13	Farahmandpour, Behrouz DO	[id number]	Insurance	[facility]  04/12/2022	04/12/2022	oxycodone-acetaminophen 5-325 mg tab	10	3	Heber Crowe MD	[id number]	Cash	[facility]  04/01/2022	04/01/2022	buprenorphine-naloxone 8-2 mg sl film	60	30	Jennifer Blevins	[id number]	Insurance	[facility]  03/28/2022	03/28/2022	buprenorphine-naloxone 8-2 mg sl film	8	4	Talha Brand	[id number]	Insurance	[facility]  03/28/2022	03/28/2022	alprazolam 0.5 mg tablet	75	25	Farahmandpour, Behrouz DO	[id number]	Insurance	[facility]  03/25/2022	03/25/2022	diazepam 5 mg tablet	10	4	Maikel Whiteside (PA)	[id number]	Insurance	[facility]
Nurse manager informed team that patient was escorted by security around 1:45pm.  Patient refused to sign AMA, and left.  Earlier in the day, patient was spoke multiple times from teams, nurses, and consulting services. Palliative team saw the patient, patient told them, he has no pain, he refused to talk to psychiatry team about his anxiety. Psychiatry team recommends against giving any benzo for concerns for drug seeking behavior.   Patient insist to get IV pain medication, if not will leave. RN manager spoke with the patient, and patient states he is going to leave without sign AMA.  Dr. New aware

## 2023-03-10 NOTE — PROGRESS NOTE ADULT - SUBJECTIVE AND OBJECTIVE BOX
INTERVAL HX:  Patient seen and examined, complained of poor pain control overnight. Awaiting MRI.     Vital Signs Last 24 Hrs  T(C): 37 (09 Mar 2023 21:18), Max: 37 (09 Mar 2023 06:17)  T(F): 98.6 (09 Mar 2023 21:18), Max: 98.6 (09 Mar 2023 06:17)  HR: 68 (09 Mar 2023 21:18) (50 - 79)  BP: 141/63 (09 Mar 2023 21:18) (85/54 - 141/63)  BP(mean): 71 (09 Mar 2023 07:32) (71 - 71)  RR: 18 (09 Mar 2023 21:18) (0 - 20)  SpO2: 99% (09 Mar 2023 21:18) (97% - 100%)    Parameters below as of 09 Mar 2023 21:18  Patient On (Oxygen Delivery Method): room air          NAD, lying in bed  Breathing comfortably on room air, no stridor, stertor  OC/OP: no erythema, bleeding, lacerations  6CFS trach in place, no active bleeding, minimal drainage.   Neck woody and indurated with post-radiation changes     A/P:   57y Male former smoker PMH laryngeal cancer s/p CRT 2007 with recurrence s/p  tracheostomy 2012, chronic back pain, and anxiety presented to [facility] ED after  discharged from [place] ED with concern for neck abscess. Patient is s/p  direct laryngoscopy with biopsy on 2/28/2023. Repeat CT neck done shows  Persistent posterior pharyngeal wall ulceration with presumed associated  retropharyngeal/prevertebral space fistulization and collection as seen  previously. The collection now extends to the right posterior to the carotid  space not seen previously. Possibilities include post radiation necrosis,  abscess or tumor infiltration. Soft tissue swelling involving the anterior and  posterior aspect of the right neck which may be inflammatory or may represent  tumor involvement. Nodular soft tissue adjacent to the tracheostomy tube within  the subglottis/upper trachea. Laryngoscope exam done at the bedside with  limited view due to pooling of secretions. Pathology shows squamous mucosa with  squamous hyperplasia, chronic inflammation, and reactive atypia    - patient left AMA from the hospital earlier in the day, now wishes to continue treatment   - unasyn   - Resume home diet  - Palliative cancer pain consult (called)  - MR of neck with and without IV (ordered)

## 2023-03-10 NOTE — BH CONSULTATION LIAISON ASSESSMENT NOTE - HPI (INCLUDE ILLNESS QUALITY, SEVERITY, DURATION, TIMING, CONTEXT, MODIFYING FACTORS, ASSOCIATED SIGNS AND SYMPTOMS)
57y Male former smoker [facility] laryngeal cancer s/p CRT 2007 with recurrence s/p  tracheostomy 2012, chronic back pain, and anxiety presented to [facility] ED for neck abscess. Patient was seen in [place] last night for pain and discomfort, and was told he had a abscess in the neck and needs to be drained. Of note patient left AMA earlier yesterday but returned. Unclear psych history has patient refusing to cooperate with interview.    Of note limited interview as patient refusing to cooperate with interview, stating he does not want or need psychiatry. Patient states he is gets anxious at night due to being in the hospital, and requesting benzos for anxiety. Patient denies any physical symptoms related to anxiety. Patient denies any depressed mood and quickly denies suicidal ideation. Of note unable to do full risk assessment as patient at this time refused to speak any further with team stating he is upset with hospitalization and wants to leave and does not need a "shrink". Interview concluded at this time.    Of note, when checking patient's ISTOP patient has been previously prescribed short durations of Oxycodone and suboxone in the past. Has also been prescribed Xanax.

## 2023-03-10 NOTE — CONSULT NOTE ADULT - SUBJECTIVE AND OBJECTIVE BOX
Chief Complaint: neck pain    HPI:  57y Male former smoker PMH laryngeal cancer s/p CRT  with recurrence s/p  tracheostomy , chronic back pain, and anxiety presented to [facility] ED for neck  abscess. Patient was seen in [place] last night for pain and  discomfort, and was told he had a abscess in the neck and needs to be drained.  He also c/o being very hungry and in a lot of pain. Denies fever, chills,  lightheadedness, palpitations, nausea and vomiting.    Patient left AMA earlier from the hospital, now has returned and wishes to continue care. Explained to patient hat it may take a day or so to get the MRI and he states he will remain patient    (09 Mar 2023 22:51)      PAST MEDICAL & SURGICAL HISTORY:  History of Laryngeal Cancer  tx with radiation and chemo      Accidental Fall  with lumbar spine injury in   Voice Disturbance  Malignant neoplasm of larynx  History of radiation therapy  [id number]  Family history of chemotherapy  [id number]  Sciatica of right side  Anxiety  History of Spinal Surgery  lumbar spinal fusion;    Abdominal Hernia Repair  Abdominal and left inguinal  Laryngoscopy  s/p Laryngoscopy, 2010 and 2010  History of Tonsillectomy  as a child  Malignant neoplasm of larynx  Laryngoscopy/Bronchoscopy with biopsy-2013  Status post tracheostomy      FAMILY HISTORY:      SOCIAL HISTORY:  [x ] Denies Alcohol, or Drug Use. Smokes 4 cigarettes/day.    Allergies  Lyrica (Unknown)      PAIN MEDICATIONS:  acetaminophen   Oral Liquid .. 975 milliGRAM(s) Oral every 6 hours PRN  hydrOXYzine hydrochloride 50 milliGRAM(s) Oral four times a day PRN  oxyCODONE    IR 5 milliGRAM(s) Oral every 6 hours PRN  oxyCODONE    IR 10 milliGRAM(s) Oral every 6 hours PRN    Heme:  enoxaparin Injectable 40 milliGRAM(s) SubCutaneous every 24 hours    Antibiotics:  ampicillin/sulbactam  IVPB 1.5 Gram(s) IV Intermittent every 6 hours    GI:  polyethylene glycol 3350 17 Gram(s) Oral daily  senna 2 Tablet(s) Oral at bedtime    Endocrine:    All Other Medications:  influenza   Vaccine 0.5 milliLiter(s) IntraMuscular once      Vital Signs Last 24 Hrs  T(C): 36.7 (10 Mar 2023 10:16), Max: 37 (09 Mar 2023 21:18)  T(F): 98.1 (10 Mar 2023 10:16), Max: 98.6 (09 Mar 2023 21:18)  HR: 54 (10 Mar 2023 10:16) (54 - 79)  BP: 109/84 (10 Mar 2023 10:16) (108/59 - 141/63)  BP(mean): --  RR: 18 (10 Mar 2023 10:16) (18 - 18)  SpO2: 100% (10 Mar 2023 10:16) (97% - 100%)    Parameters below as of 10 Mar 2023 10:16  Patient On (Oxygen Delivery Method): room air        PAIN SCORE:   9/10      SCALE USED: (1-10 VNRS)           LABS:                          11.6   10.40 )-----------( 318      ( 09 Mar 2023 07:15 )             36.2     03-09    137  |  101  |  9   ----------------------------<  102<H>  4.1   |  25  |  0.68    Ca    8.9      09 Mar 2023 07:15    TPro  6.8  /  Alb  3.8  /  TBili  0.2  /  DBili  x   /  AST  19  /  ALT  17  /  AlkPhos  95  03-09    PT/INR - ( 09 Mar 2023 07:15 )   PT: 12.0 sec;   INR: 1.03 ratio         PTT - ( 09 Mar 2023 07:15 )  PTT:31.6 sec  Urinalysis Basic - ( 09 Mar 2023 08:45 )    Color: Light Yellow / Appearance: Clear / S.015 / pH: x  Gluc: x / Ketone: Negative  / Bili: Negative / Urobili: <2 mg/dL   Blood: x / Protein: Negative / Nitrite: Negative   Leuk Esterase: Negative / RBC: x / WBC x   Sq Epi: x / Non Sq Epi: x / Bacteria: x      Summary:  Patient seen at bedside in street clothes. Patient has a tracheostomy and speaking in a loud whisper. Reports severe right-sided neck pain near tracheostomy and radiates to his right shoulder. He states 3 days ago was when he was unable to turn his neck. Patient states he feels sharp pain, spasms, stiffness. It is difficult for him to swallow and the right side of his neck feels "hard as a rock". He has lost weight going from 133lbs down to 120lbs in the past couple week. Patient is a current smoker and smokes about 4 cigarettes daily. He refused Nicotine patch. Patient states Oxycodone does not help and has only taken 2 doses per EMAR, but IV Dilaudid is better. Patient states he has to suck on the medication until it dissolves. Discussed with patient the possibility of pain medication in solution form and patient preferred to continue pills. Patient is awaiting MRI which will determine if and when he will require surgical intervention. He was at [facility] 2 days ago and imaging was done there. Patient expressing frustration that it is taking over 24 hours to have MRI done and that he has to wait since 05:30AM yesterday when he could have had it done quickly outpatient. Patient stating he will leave the hospital if MRI is not performed by 1pm.  Patient reports having been on Suboxone for pain in the past but stopped taking it when he began using Percocet. He has an allergy to Lyrica and experiences swelling, but has tolerated Gabapentin. Patient takes Xanax for anxiety but denies anxiety/depression and refusing Psych consult. He reports experiencing anxiety now only because he has been waiting for imaging and treatment while in pain. He does not currently have an outpatient pain management doctor but has a care coordinator who can help him find an appointment. Patient repeatedly expressing frustration with waiting for MRI and ended interview, asking us to leave.    PHYSICAL EXAM:  GENERAL: Alert & Oriented x 3 in NAD. Tracheostomy in place. Limited ROM turning head.       Recommendations:  -  Consider discontinuing current orders for Acetaminophen and change to standing x2 days, then PRN.  -  Consider ordering Gabapentin 300mg Q8H. Hold for sedation.  -  Consider ordering Flexeril 10mg Q8H standing x 2 days, then PRN. Hold for sedation.  -  Consider discontinuing current orders for Oxycodone. Instead:  -  Consider ordering Oxycodone 10mg Q4H PRN moderate to severe pain. Hold for sedation. Not to be given within 1 hour of any immediate acting opioid.  -  Consider ordering IV Dilaudid 0.5mg Q4H PRN severe breakthrough pain. Hold for sedation. Not to be given within 1 hour of any immediate acting opioid.  -  Recommend maintaining continuous pulse oximetry.  -  Recommend follow up with Chronic Pain doctor when discharged. If patient does not have a Chronic Pain doctor, may acquire one through patient's personal insurance carrier.  Discussed patient with Chronic Pain Attending on call, Dr. Marquez, who agrees with the above recommendations.  No further recommendations at this time, Chronic pain service to sign off. May call Chronic Pain Service if needed.   Thank you.    [x ]  [facility]  Reviewed. Reference #[id number]  Oxycodone-acetaminophen 10-325mg tablet. Quantity 10 tablets for 2 days. Last dispensed 2023.  Oxycodone-acetaminophen 5-325mg tablet. Quantity 12 tablets for 3 days. Last dispensed 2023.  Buprenorphine-naloxone 8-2mg sl film. Quantity 90 films for 30 days. Last dispensed 2023.  Alprazolam 0.5mg tablet. Quantity 9 tables for 3 days. Last dispensed 2023.  Tramadol 50mg tablet. Quantity 20 tablets for 5 days. Last dispensed 10/31/2022.  Diazepam 5mg tablet. Quantity 10 tablets for 4 days. Last dispensed 2022.    
[facility]  Agahta Green, Palliative Care Attending  Contact Info: Page [phone] (including Nights/Weekends), message on Microsoft Teams (Agatha Green), or leave VM at Palliative Office [phone] (non-urgent)     HPI: 57y Male former smoker PMH laryngeal cancer s/p CRT  with recurrence s/p tracheostomy , chronic back pain, and anxiety presented to [facility] ED for neck abscess. Patient was seen in [place] last night for pain and  discomfort, and was told he had a abscess in the neck and needs to be drained. He also c/o being very hungry and in a lot of pain. Denies fever, chills, lightheadedness, palpitations, nausea and vomiting.    Patient left AMA earlier from the hospital, now has returned and wishes to continue care. Explained to patient hat it may take a day or so to get the MRI and he states he will remain patient    (09 Mar 2023 22:51)    PERTINENT PM/SXH:   History of Laryngeal Cancer  Accidental Fall  Abdominal Hernia ER due to pain 9/15/10  Voice Disturbance  Malignant neoplasm of larynx  History of radiation therapy  Family history of chemotherapy  Sciatica of right side  Anxiety  History of Spinal Surgery  Abdominal Hernia Repair  Laryngoscopy  History of Tonsillectomy  Abdominal surgery  Malignant neoplasm of larynx  Status post tracheostomy    FAMILY HISTORY: No significant family history     Family Hx substance abuse [ ]yes [ x]no  ITEMS NOT CHECKED ARE NOT PRESENT    SOCIAL HISTORY:   Significant other/partner[ ]  Children[ ]  Shinto/Spirituality:  Substance hx:  [ ]   Tobacco hx:  [x ]   Alcohol hx: [ ]   Home Opioid hx:  [ ] I-Stop Reference No:   This report was requested by: Agatha Green | Reference #: [id number]    Others' Prescriptions  Patient Name: Josh NavarreteBirth Date: 1965  Address: [address] [place] [address] [address] [place] , [place] [address]Umv: Male  Rx Written	Rx Dispensed	Drug	Quantity	Days Supply	Prescriber Name	Prescriber Tanesha #	Payment Method	Dispenser  2023	oxycodone hcl (ir) 5 mg tablet	8	2	Louis Codey	[id number]	[facility]  2023	oxycodone-acetaminophen  mg tab	9	3	[facility]	[id number]	[facility] #[id number]    Patient Name: Josh Ribera Date: 1965  Address: [address] [place] [id number]: Male  Rx Written	Rx Dispensed	Drug	Quantity	Days Supply	Prescriber Name	Prescriber Tanesha #	Payment Method	Dispenser  2023	oxycodone-acetaminophen  mg tab	10	2	Robi Michelle	[id number]	Vega	[facility]  2023	oxycodone-acetaminophen  mg tab	12	3	Marina Moncada NP	[id number]	Vega	[facility]  2023	oxycodone-acetaminophen  mg tab	8	2	Lindsey Em	[id number]	Vega	[facility]  2023	oxycodone-acetaminophen  mg tab	4	1	Erica Osborn	[id number]	Vega	[facility]  2023	oxycodone-acetaminophen 5-325 mg tab	12	3	Caprice Ferreira	[id number]	Vega	[facility]  2023	oxycodone-acetaminophen  mg tab	9	2	[facility]	[id number]	Vega	[facility]  2023	buprenorphine-naloxone 8-2 mg sl film	90	30	Nasreen Garcia	[id number]	Insurance	[facility]  2023	oxycodone-acetaminophen  mg tab	10	2	Maikel Whiteside (PA)	[id number]	Cash	[facility]  2023	oxycodone-acetaminophen 5-325 mg tab	10	3	Kath Dorado	[id number]	Cash	[facility]  2023	alprazolam 0.5 mg tablet	9	3	Jessica Wilson	[id number]	Insurance	[facility]  2023	buprenorphine-naloxone 8-2 mg sl film	90	30	Josh Strickland	[id number]	Insurance	[facility]  2022	suboxone 8 mg-2 mg sl film	90	30	Kenyatta Nevesashlie	[id number]	Insurance	[facility]  10/31/2022	2022	suboxone 8 mg-2 mg sl film	90	30	Gelling, Nasreen	[id number]	Insurance	[facility]  10/28/2022	10/31/2022	tramadol hcl 50 mg tablet	20	5	Echo Mejia	[id number]	Vega	[facility]  10/27/2022	10/28/2022	suboxone 8 mg-2 mg sl film	17	7	Daphne Riggs	[id number]	Insurance	[facility]  10/20/2022	10/21/2022	oxycodone-acetaminophen 5-325 mg tab	10	2	Angela Catherine	[id number]	Vega	[facility]  10/19/2022	10/19/2022	oxycodone-acetaminophen 5-325 mg tab	8	2	Jessica Wilson	[id number]	Cash	[facility]  10/12/2022	10/12/2022	oxycodone hcl (ir) 5 mg tablet	15	4	Mervin Coreas MD	[id number]	Vega	[facility]  2022	buprenorphine-naloxone 8-2 mg sl film	83	30	PuraRenetta	[id number]	Insurance	[facility]  2022	alprazolam 0.5 mg tablet	9	3	Indigo Pink	[id number]	Insurance	[facility]  2022	oxycodone hcl (ir) 5 mg tablet	20	5	Liana Hannon	[id number]	Vega	[facility]  2022	suboxone 8 mg-2 mg sl film	83	30	Gelling, Nasreen	[id number]	Insurance	[facility]  2022	alprazolam 0.5 mg tablet	9	3	Heber Crowe MD	[id number]	Insurance	[facility]  2022	suboxone 8 mg-2 mg sl film	75	30	Gelling, Nasreen	[id number]	Insurance	[facility]  07/15/2022	07/15/2022	alprazolam 0.5 mg tablet	75	25	Yann Proctor L	[id number]	Insurance	[facility]  2022	suboxone 8 mg-2 mg sl film	75	30	Jennifer Blevins	[id number]	Insurance	[facility]  2022	suboxone 8 mg-2 mg sl film	35	14	Nasreen Garcia	[id number]	Insurance	[facility]  2022	alprazolam 0.5 mg tablet	38	13	Farahmandpour, Behrouz DO	[id number]	Insurance	[facility]  2022	suboxone 8 mg-2 mg sl film	35	14	Gladis Huizar	[id number]	Insurance	[facility]  2022	suboxone 8 mg-2 mg sl film	35	14	Talha Brand	[id number]	Insurance	[facility]  2022	alprazolam 0.5 mg tablet	81	27	Abdimandpour, Behrouz DO	[id number]	Insurance	[facility]  2022	05/10/2022	alprazolam 0.5 mg tablet	38	13	Farahmandpour, Behrouz DO	[id number]	Insurance	[facility]  2022	buprenorphine-naloxone 8-2 mg sl film	35	14	Talha Brand	[id number]	Insurance	[facility]  2022	buprenorphine-naloxone 8-2 mg sl film	35	14	Talha Brand	[id number]	Insurance	[facility]  2022	alprazolam 0.5 mg tablet	38	13	Farahmandpour, Behrouz DO	[id number]	Insurance	[facility]  2022	oxycodone-acetaminophen 5-325 mg tab	10	3	Heber Crowe MD	[id number]	Cash	[facility]  2022	buprenorphine-naloxone 8-2 mg sl film	60	30	Jennifer Blevins	[id number]	Insurance	[facility]  2022	buprenorphine-naloxone 8-2 mg sl film	8	4	Talha Brand	[id number]	Insurance	[facility]  2022	alprazolam 0.5 mg tablet	75	25	Farahmandpour, Behrouz DO	[id number]	Insurance	[facility]  2022	diazepam 5 mg tablet	10	4	Maikel Whiteside (PA)	[id number]	Insurance	[facility].    Living Situation: [x ]Home  [ ]Long term care  [ ]Rehab [ ]Other    ADVANCE DIRECTIVES:    DNR/MOLST  [ ]  Living Will  [ ]   DECISION MAKER(s):  [ ] Health Care Proxy(s)  [ ] Surrogate(s)  [ ] Guardian           Name(s): Phone Number(s):    BASELINE (I)ADL(s) (prior to admission):  [place]: [ x]Total  [ ] Moderate [ ]Dependent    Allergies    Lyrica (Unknown)    Intolerances    MEDICATIONS  (STANDING):  ampicillin/sulbactam  IVPB 1.5 Gram(s) IV Intermittent every 6 hours  enoxaparin Injectable 40 milliGRAM(s) SubCutaneous every 24 hours  influenza   Vaccine 0.5 milliLiter(s) IntraMuscular once  polyethylene glycol 3350 17 Gram(s) Oral daily  senna 2 Tablet(s) Oral at bedtime    MEDICATIONS  (PRN):  acetaminophen   Oral Liquid .. 975 milliGRAM(s) Oral every 6 hours PRN Mild Pain (1 - 3)  albuterol    90 MICROgram(s) HFA Inhaler 2 Puff(s) Inhalation every 6 hours PRN Shortness of Breath and/or Wheezing  hydrOXYzine hydrochloride 50 milliGRAM(s) Oral four times a day PRN Anxiety  oxyCODONE    IR 5 milliGRAM(s) Oral every 6 hours PRN Moderate Pain (4 - 6)  oxyCODONE    IR 10 milliGRAM(s) Oral every 6 hours PRN Severe Pain (7 - 10)    PRESENT SYMPTOMS: [ ]Unable to self-report  [ ] CPOT [ ] PAINADs [ ] RDOS  Source if other than patient:  [ ]Family   [ ]Team     Pain: [ x]yes [ ]no  QOL impact - patient has trach and reports having "abscess" that is causing him difficulty talking   Location -  neck     Aggravating factors - talking, unable to cap his trach   Quality - unclear   Radiation - unclear    Timing- unclear    Severity (0-10 scale): 7  Minimal acceptable level/pain goal (0-10 scale): 3    CPOT:    https://www.[facility].org/getattachment/[email or web address]-8u9q-6288n7107k8c/Critical-Care-Pain-Observation-Tool-(CPOT)    Dyspnea:                           [ ]Mild [ ]Moderate [ ]Severe  Anxiety:                             [ ]Mild [ ]Moderate [ ]Severe  Fatigue:                             [ ]Mild [ ]Moderate [ ]Severe  Nausea:                             [ ]Mild [ ]Moderate [ ]Severe  Loss of appetite:              [ ]Mild [ ]Moderate [ ]Severe  Constipation:                    [ ]Mild [ ]Moderate [ ]Severe    Other Symptoms: PATIENT FRUSTRATED ABOUT HAVING TO WAIT FOR MRI   [x ]All other review of systems negative     PCSSQ[Palliative Care Spiritual Screening Question]   Severity (0-10):  Chaplaincy Referral: [ ] yes [ ] refused [ ] following [ x] deferred     Caregiver [place]? : [ ] yes [ ] no [ x] Deferred [ ] Declined             Social work referral [ ] Patient & Family Centered Care Referral [ ]  Anticipatory Grief present?:  [ ] yes [ ] no  [x ] Deferred                  Social work referral [ ] Patient & Family Centered Care Referral [ ]    PHYSICAL EXAM:  Vital Signs Last 24 Hrs  T(C): 36.7 (10 Mar 2023 10:16), Max: 37 (09 Mar 2023 21:18)  T(F): 98.1 (10 Mar 2023 10:16), Max: 98.6 (09 Mar 2023 21:18)  HR: 54 (10 Mar 2023 10:16) (54 - 79)  BP: 109/84 (10 Mar 2023 10:16) (108/59 - 141/63)  BP(mean): --  RR: 18 (10 Mar 2023 10:16) (18 - 18)  SpO2: 100% (10 Mar 2023 10:16) (97% - 100%)    Parameters below as of 10 Mar 2023 10:16  Patient On (Oxygen Delivery Method): room air     I&O's Summary    GENERAL: [ ]Cachexia    [ x]Alert  [x ]Oriented x  4 [ ]Lethargic  [ ]Unarousable  [x ]Verbal  [ ]Non-Verbal  Behavioral:   [ ] Anxiety  [ ] Delirium [ ] Agitation [x] Other  HEENT:  [ ]Normal   [ ]Dry mouth   [x ]ET Tube/Trach  [ ]Oral lesions  PULMONARY:   [ x]Clear [ ]Tachypnea  [ ]Audible excessive secretions   [ ]Rhonchi        [ ]Right [ ]Left [ ]Bilateral  [ ]Crackles        [ ]Right [ ]Left [ ]Bilateral  [ ]Wheezing     [ ]Right [ ]Left [ ]Bilateral  [ ]Diminished breath sounds [ ]right [ ]left [ ]bilateral  CARDIOVASCULAR:    [ ]Regular [ ]Irregular [ ]Tachy  [ ]Srinivas [ ]Murmur [ ]Other  GASTROINTESTINAL:  [ ]Soft  [ ]Distended   [ ]+BS  [ ]Non tender [ ]Tender  [ ]Other [ ]PEG [ ]OGT/ NGT  Last BM: ?  GENITOURINARY:  [ x]Normal [ ] Incontinent   [ ]Oliguria/Anuria   [ ]Pascal  MUSCULOSKELETAL:   [ x]Normal   [ ]Weakness  [ ]Bed/Wheelchair bound [ ]Edema  NEUROLOGIC:   [x ]No focal deficits  [ ]Cognitive impairment  [ ]Dysphagia [ ]Dysarthria [ ]Paresis [ ]Other   SKIN: Please see flowsheets   [ x]Normal  [ ]Rash  [ ]Other  [ ]Pressure ulcer(s)       Present on admission [ ]y [ ]n    LABS:                        11.6   10.40 )-----------( 318      ( 09 Mar 2023 07:15 )             36.2   03-09    137  |  101  |  9   ----------------------------<  102<H>  4.1   |  25  |  0.68    Ca    8.9      09 Mar 2023 07:15    TPro  6.8  /  Alb  3.8  /  TBili  0.2  /  DBili  x   /  AST  19  /  ALT  17  /  AlkPhos  95  03-09  PT/INR - ( 09 Mar 2023 07:15 )   PT: 12.0 sec;   INR: 1.03 ratio         PTT - ( 09 Mar 2023 07:15 )  PTT:31.6 sec    Urinalysis Basic - ( 09 Mar 2023 08:45 )    Color: Light Yellow / Appearance: Clear / S.015 / pH: x  Gluc: x / Ketone: Negative  / Bili: Negative / Urobili: <2 mg/dL   Blood: x / Protein: Negative / Nitrite: Negative   Leuk Esterase: Negative / RBC: x / WBC x   Sq Epi: x / Non Sq Epi: x / Bacteria: x      RADIOLOGY & ADDITIONAL STUDIES: < from: CT Neck Soft Tissue w/ IV Cont (23 @ 10:04) >  IMPRESSION:  Persistent posterior pharyngeal wall ulceration with presumed associated   retropharyngeal/prevertebral space fistulization and collection as seen   previously. The collection now extends to the right posterior to the   carotid space not seen previously. Possibilities include post radiation   necrosis, abscess or tumor infiltration.    Soft tissue swelling involving the anterior and posterior aspect of the   right neck which may be inflammatory or may represent tumor involvement.    Nodular soft tissue adjacent to the tracheostomy tube within the   subglottis/upper trachea. The possibility of recurrent tumor in this   location cannot be excluded and correlation with direct visualization is   advised.    No cervical adenopathy.    < end of copied text >      PROTEIN CALORIE MALNUTRITION PRESENT: [ ]mild [ ]moderate [ ]severe [ ]underweight [ ]morbid obesity  https://www.andeal.org/vault/2440/web/files/ONC/Table_Clinical%20Characteristics%20to%20Document%20Malnutrition-White%20JV%20et%20al%828810.pdf    Height (cm): 175.3 (23 @ 18:53), 175.3 (23 @ 06:17), 175.3 (23 @ 13:43)  Weight (kg): 54.431 (03-10-23 @ 06:43), 77.1 (23 @ 13:43)  BMI (kg/m2): 17.7 (03-10-23 @ 06:43), 25.1 (23 @ 18:53), 25.1 (23 @ :17)    [ ]PPSV2 < or = to 30% [ ]significant weight loss  [ ]poor nutritional intake  [ ]anasarca[ ]Artificial Nutrition      Other REFERRALS:  [ ]Hospice  [ ]Child Life  [ ]Social Work  [ ]Case management [ ]Holistic Therapy

## 2023-03-10 NOTE — PATIENT PROFILE ADULT - FALL HARM RISK - UNIVERSAL INTERVENTIONS
Bed in lowest position, wheels locked, appropriate side rails in place/Call bell, personal items and telephone in reach/Instruct patient to call for assistance before getting out of bed or chair/Non-slip footwear when patient is out of bed/[place] to call system/Physically safe environment - no spills, clutter or unnecessary equipment/Purposeful Proactive Rounding/Room/bathroom lighting operational, light cord in reach

## 2023-03-10 NOTE — BH CONSULTATION LIAISON ASSESSMENT NOTE - RISK ASSESSMENT
Unable to do complete risk assessment.  Patient is at low acute risk for suicide. Patient denies suicidal ideation.  Modifiable risk factors: current substance use, impulsivity, acute medical conditions  Unmodifiable risk factors: male gender, chronic medical conditions    Immediate risk is minimized by inpatient admission to safe environment with appropriate supervision and limited access to lethal means. Future risk to be minimized by treatment of acute symptoms, maximizing outpatient supports, providing patient education, discussing emergency procedures, and ensuring close follow-up.

## 2023-03-10 NOTE — CONSULT NOTE ADULT - PROBLEM SELECTOR RECOMMENDATION 9
Patient with extensive I-stop records. He states he self-discontinued form suboxone and only takes oxycodone at home. He is c/o acute pain from possible abscess. He is pending MRI neck to r/o neck abscess.   > Would recommend continued abx and can give oxycodone 5mg q6h prn severe pain (unclear if patient has outpatient chronic pain f/u as there are many providers listed on I-stop)   > bowel regimen while on opioids

## 2023-03-10 NOTE — BH CONSULTATION LIAISON ASSESSMENT NOTE - NSBHATTESTCOMMENTATTENDFT_PSY_A_CORE
brief limited exam, patient refused to speak to writers, no concern for safety upon admission. Of note patient seen by psych in past, was noted to have drug seeking behaviors in past had been recommended to f/u with methadone program. If desires can f/u with [facility] [phone], SBIRT eval IF patient is interested. No psych c/i to discharge noted.

## 2023-03-13 ENCOUNTER — APPOINTMENT (OUTPATIENT)
Dept: OTOLARYNGOLOGY | Facility: CLINIC | Age: 58
End: 2023-03-13
Payer: MEDICAID

## 2023-03-13 VITALS
DIASTOLIC BLOOD PRESSURE: 54 MMHG | OXYGEN SATURATION: 98 % | SYSTOLIC BLOOD PRESSURE: 95 MMHG | HEART RATE: 51 BPM | HEIGHT: 70 IN | BODY MASS INDEX: 18.04 KG/M2 | WEIGHT: 126 LBS

## 2023-03-13 DIAGNOSIS — L08.9 LOCAL INFECTION OF THE SKIN AND SUBCUTANEOUS TISSUE, UNSPECIFIED: ICD-10-CM

## 2023-03-13 PROCEDURE — 99213 OFFICE O/P EST LOW 20 MIN: CPT

## 2023-03-13 RX ORDER — TRAMADOL HYDROCHLORIDE 50 MG/1
50 TABLET, COATED ORAL
Qty: 20 | Refills: 0 | Status: COMPLETED | COMMUNITY
Start: 2022-10-28

## 2023-03-13 RX ORDER — OXYCODONE HYDROCHLORIDE AND ACETAMINOPHEN 10; 325 MG/1; MG/1
TABLET ORAL
Refills: 0 | Status: COMPLETED | COMMUNITY
End: 2023-03-13

## 2023-03-13 RX ORDER — AMOXICILLIN AND CLAVULANATE POTASSIUM 600; 42.9 MG/5ML; MG/5ML
600-42.9 FOR SUSPENSION ORAL
Qty: 1 | Refills: 0 | Status: ACTIVE | COMMUNITY
Start: 2023-03-13 | End: 1900-01-01

## 2023-03-13 RX ORDER — FLUCONAZOLE 200 MG/1
200 TABLET ORAL
Qty: 14 | Refills: 0 | Status: COMPLETED | COMMUNITY
Start: 2022-10-25

## 2023-03-13 RX ORDER — AMOXICILLIN AND CLAVULANATE POTASSIUM 875; 125 MG/1; MG/1
875-125 TABLET, COATED ORAL
Qty: 20 | Refills: 0 | Status: COMPLETED | COMMUNITY
Start: 2023-03-08

## 2023-03-13 RX ORDER — METHYLPREDNISOLONE 4 MG/1
4 TABLET ORAL
Qty: 21 | Refills: 0 | Status: COMPLETED | COMMUNITY
Start: 2023-01-26

## 2023-03-13 RX ORDER — AMOXICILLIN AND CLAVULANATE POTASSIUM 600; 42.9 MG/5ML; MG/5ML
600-42.9 FOR SUSPENSION ORAL
Qty: 250 | Refills: 0 | Status: COMPLETED | COMMUNITY
Start: 2023-01-10

## 2023-03-13 RX ORDER — FLUCONAZOLE 100 MG/1
100 TABLET ORAL
Qty: 14 | Refills: 0 | Status: COMPLETED | COMMUNITY
Start: 2023-02-05

## 2023-03-13 RX ORDER — CIPROFLOXACIN 500 MG/5ML
500 MG/5ML KIT ORAL
Qty: 100 | Refills: 0 | Status: COMPLETED | COMMUNITY
Start: 2022-12-01

## 2023-03-13 RX ORDER — AMOXICILLIN 250 MG/5ML
250 POWDER, FOR SUSPENSION ORAL
Qty: 240 | Refills: 0 | Status: COMPLETED | COMMUNITY
Start: 2022-11-21

## 2023-03-13 RX ORDER — OXYCODONE AND ACETAMINOPHEN 10; 325 MG/1; MG/1
10-325 TABLET ORAL EVERY 6 HOURS
Qty: 8 | Refills: 0 | Status: COMPLETED | COMMUNITY
Start: 2023-02-24 | End: 2023-03-13

## 2023-03-13 RX ORDER — NYSTATIN 100000 [USP'U]/ML
100000 SUSPENSION ORAL
Qty: 140 | Refills: 0 | Status: COMPLETED | COMMUNITY
Start: 2023-01-05

## 2023-03-13 RX ORDER — SULFAMETHOXAZOLE AND TRIMETHOPRIM 200; 40 MG/5ML; MG/5ML
200-40 SUSPENSION ORAL
Qty: 200 | Refills: 0 | Status: COMPLETED | COMMUNITY
Start: 2023-01-05

## 2023-03-13 RX ORDER — OXYCODONE AND ACETAMINOPHEN 10; 325 MG/1; MG/1
10-325 TABLET ORAL
Qty: 12 | Refills: 0 | Status: COMPLETED | COMMUNITY
Start: 2023-03-01 | End: 2023-03-13

## 2023-03-13 RX ORDER — FLUTICASONE PROPIONATE 50 UG/1
50 SPRAY, METERED NASAL DAILY
Qty: 1 | Refills: 2 | Status: COMPLETED | COMMUNITY
Start: 2023-02-07 | End: 2023-03-13

## 2023-03-14 LAB
CULTURE RESULTS: SIGNIFICANT CHANGE UP
CULTURE RESULTS: SIGNIFICANT CHANGE UP
SPECIMEN SOURCE: SIGNIFICANT CHANGE UP
SPECIMEN SOURCE: SIGNIFICANT CHANGE UP

## 2023-03-15 NOTE — ASSESSMENT
[FreeTextEntry1] : 57 yM with hx of laryngeal SCC s/p CRT in 2007, presents with in follow up and concern for small neck abscess seen on outside CT\par \par --Will obtain MRI (was unable to obtain in hospital d/t leaving AMA multiple times)\par --Discussed importance of smoking cessation\par --course of augmentin\par --He will follow up with his chronic pain specialists re pain med refills\par --Follow up 2-3 weeks to remove MRI imaging, he is an agreement with this plan

## 2023-03-15 NOTE — HISTORY OF PRESENT ILLNESS
[de-identified] : 57 year old male, following up for tracheitis.\par hx of laryngeal SCC s/p CRT in 2007. \par PET scan completed 1/26/23. Impression: Prominent hypermetabolic activity along the right side of tracheostomy tube, most consistent with severe infection/abscess. \par Patient was recently at [facility] last week for MRI- left before completing MRI. Has left AMA from hospital multiple times delaying imaging and workup.\par Reports difficulty turning his neck and increasing dysphagia and aspiration \par Reports dyspnea treated with Albuterol and foul smelling secretions from trach. \par Patient reports pain removing and cleaning trach. \par 4 lb weight loss since last visit 2/13/23

## 2023-03-15 NOTE — PHYSICAL EXAM
[Midline] : trachea located in midline position [Laryngoscopy Performed] : laryngoscopy was performed, see procedure section for findings [Normal] : no rashes [FreeTextEntry1] : post CRT [de-identified] : fibrotic, post RT change, tracheal stoma in place. peristomal grantulation [de-identified] : diffuse fullnes of base of tongue, whitish exudate, secretions obscures mucosa

## 2023-05-04 NOTE — ED ADULT NURSE NOTE - AS SC BRADEN ACTIVITY
(4) walks frequently Griseofulvin Counseling:  I discussed with the patient the risks of griseofulvin including but not limited to photosensitivity, cytopenia, liver damage, nausea/vomiting and severe allergy.  The patient understands that this medication is best absorbed when taken with a fatty meal (e.g., ice cream or french fries).

## 2023-06-21 NOTE — ED ADULT NURSE NOTE - NSIMPLEMENTINTERV_GEN_ALL_ED
normal appearance , no deformities Implemented All Universal Safety Interventions:  [place] to call system. Call bell, personal items and telephone within reach. Instruct patient to call for assistance. Room bathroom lighting operational. Non-slip footwear when patient is off stretcher. Physically safe environment: no spills, clutter or unnecessary equipment. Stretcher in lowest position, wheels locked, appropriate side rails in place.

## 2023-06-26 NOTE — ASU PATIENT PROFILE, ADULT - PAIN LOCATION, PROFILE
Medication to be refilled: venlafaxine XR (EFFEXOR XR) 37.5 MG 24 hr capsule  : Take 1 capsule by mouth daily. - Oral    Last Refill: 3/27/2023 # 90x 1 refills     Refill on file. Refill request denied.        neck

## 2023-07-27 NOTE — H&P ADULT - PATIENT'S SEXUAL ORIENTATION
CAMILA BENNETT  46y, Female  Allergy: No Known Allergies      CHIEF COMPLAINT: fever in setting of elevated LFTs (2023 15:30)      LOS  1d    HPI:  47 y/o F w/ pmhx pf cholecystectomy who presents with fever, sweats, chills, body aches, dry cough, headache, shortness of breath, and one episode of vomiting for 5 days. Patient has been taking tylenol without improvement. Patient states that she has bilateral headache that worsens with movement or walking. It feels like pressure building up in her head. No recent travel or sick contacts.    Denies eye pain, ear pain, sore throat, constipation, abdominal pain, diarrhea, urinary symptoms.    ED Course:  Vital Signs Last 24 Hrs  T(C): 38.9 (2023 08:50), Max: 38.9 (2023 08:50)  T(F): 102.1 (2023 08:50), Max: 102.1 (2023 08:50)  HR: 105 (2023 08:50) (105 - 120)  BP: 139/75 (2023 08:50) (135/87 - 140/78)  BP(mean): 101 (2023 08:50) (101 - 101)  RR: 20 (2023 08:50) (20 - 20)  SpO2: 96% (2023 08:50) (95% - 98%)    Parameters below as of 2023 08:50  Patient On (Oxygen Delivery Method): room air    In the ED: CXR was unremarkable. LFT were elevated and a RUQ US was performed that was unremarkable. CTH NC no acute intracranial bleed or masses.  (2023 15:30)      INFECTIOUS DISEASE HISTORY:  History as above.    PAST MEDICAL & SURGICAL HISTORY:  Pyelitis      Urinary tract infection      History of cholecystectomy          FAMILY HISTORY  No pertinent family history in first degree relatives        SOCIAL HISTORY  Social History:  Denies tobacco use, alcohol use, or recreational drug use.    Mother  in her 70s unknown cause  Father  unknown age or cause (2023 15:30)        ROS  General: Denies rigors, nightsweats  HEENT: Denies headache, rhinorrhea, sore throat, eye pain  CV: Denies CP, palpitations  PULM: Denies wheezing, hemoptysis  GI: Denies hematemesis, hematochezia, melena  : Denies discharge, hematuria  MSK: Denies arthralgias, myalgias  SKIN: Denies rash, lesions  NEURO: Denies paresthesias, weakness  PSYCH: Denies depression, anxiety    VITALS:  T(F): 99.6, Max: 102.1 (23 @ 08:50)  HR: 106  BP: 113/68  RR: 18Vital Signs Last 24 Hrs  T(C): 37.6 (2023 00:35), Max: 38.9 (2023 08:50)  T(F): 99.6 (2023 00:35), Max: 102.1 (2023 08:50)  HR: 106 (2023 00:00) (105 - 120)  BP: 113/68 (2023 00:00) (113/68 - 142/84)  BP(mean): 101 (2023 08:50) (101 - 101)  RR: 18 (2023 00:00) (18 - 20)  SpO2: 97% (2023 00:00) (95% - 97%)    Parameters below as of 2023 00:00  Patient On (Oxygen Delivery Method): room air        PHYSICAL EXAM:  Gen: NAD, resting in bed  HEENT: Normocephalic, atraumatic  Neck: supple, no lymphadenopathy  CV: Regular rate & regular rhythm  Lungs: decreased BS at bases, no fremitus  Abdomen: Soft, BS present  Ext: Warm, well perfused  Neuro: non focal, awake  Skin: no rash, no erythema  Lines: no phlebitis    TESTS & MEASUREMENTS:                        13.1  3.21  )-----------( 142      ( 2023 01:12 )             39.1        140  |  105  |  5<L>  ----------------------------<  101<H>  4.2   |  24  |  0.6<L>    Ca    8.4      2023 04:10    TPro  5.7<L>  /  Alb  3.1<L>  /  TBili  0.8  /  DBili  0.5<H>  /  AST  221<H>  /  ALT  366<H>  /  AlkPhos  280<H>        LIVER FUNCTIONS - ( 2023 04:10 )  Alb: 3.1 g/dL / Pro: 5.7 g/dL / ALK PHOS: 280 U/L / ALT: 366 U/L / AST: 221 U/L / GGT: x          Urinalysis Basic - ( 2023 04:10 )    Color: x / Appearance: x / SG: x / pH: x  Gluc: 101 mg/dL / Ketone: x  / Bili: x / Urobili: x  Blood: x / Protein: x / Nitrite: x  Leuk Esterase: x / RBC: x / WBC x  Sq Epi: x / Non Sq Epi: x / Bacteria: x        Culture - Urine (collected 23 @ 18:25)  Source: Clean Catch Clean Catch (Midstream)  Final Report (23 @ 16:22):    >=3 organisms. Probable collection contamination.        Lactate, Blood: 1.4 mmol/L (23 @ 21:57)      INFECTIOUS DISEASES TESTING  Hepatitis B Surface Antigen: Nonreact (23 @ 02:22)  Hepatitis C Virus Interpretation: Nonreact (23 @ 02:22)      RADIOLOGY & ADDITIONAL TESTS:  I have personally reviewed the last Chest xray  CXR  Xray Chest 1 View- PORTABLE-Urgent:  ACC: [id number] EXAM:  XR CHEST PORTABLE URGENT 1V   ORDERED BY: LENORE BLANK    PROCEDURE DATE:  2023          INTERPRETATION:  Clinical History / Reason for exam: Fever, bodyaches    Comparison : Chest radiograph 2023.    Technique/Positioning: Single AP chest radiograph.    Findings:    Support devices: None.    Cardiac/mediastinum/hilum: Stable    Lung parenchyma/Pleura: Low lung volumes with left basilar  opacity/effusion.    Skeleton/soft tissues: Severe right glenohumeral degenerative change.    Impression:    Low lung volumes with left basilar opacity/effusion.        --- End of Report ---            ENMANUEL CISSE MD; Attending Radiologist  This document has been electronically signed. 2023  9:28AM (23 @ 01:19)      CT      CARDIOLOGY TESTING  12 Lead ECG:  Ventricular Rate 82 BPM    Atrial Rate 82 BPM    P-R Interval 140 ms    QRS Duration 120 ms    Q-T Interval 410 ms    QTC Calculation(Bazett) 479 ms    P Axis 16 degrees    R Axis 3 degrees    T Axis 5 degrees    Diagnosis Line Normal sinus rhythm  Right bundle branch block  Abnormal ECG    Confirmed by ROBERT VANEGAS MD ([id number]) on 2023 6:55:38 AM (23 @ 01:59)      MEDICATIONS  cefTRIAXone   IVPB 2000 IV Intermittent every 24 hours  enoxaparin Injectable 40 SubCutaneous every 24 hours  lactated ringers. 1000 IV Continuous <Continuous>  lactated ringers. 1000 IV Continuous <Continuous>  pantoprazole    Tablet 40 Oral before breakfast      Weight  Weight (kg): 68 (23 @ 23:46)    ANTIBIOTICS:  cefTRIAXone   IVPB 2000 milliGRAM(s) IV Intermittent every 24 hours      ALLERGIES:  No Known Allergies       Gastroenterology Consultation:    Patient is a 46y old  Female who presents with a chief complaint of fever in setting of elevated LFTs (26 Jul 2023 15:30)        Admitted on: 07-26-23      HPI:  47 y/o F w/ pmhx pf cholecystectomy who presents with fever, sweats, chills, body aches, dry cough, headache, shortness of breath, and one episode of vomiting for 5 days. Patient has been taking tylenol without improvement. Patient states that she has bilateral headache that worsens with movement or walking. It feels like pressure building up in her head. No recent travel or sick contacts.   Denies eye pain, ear pain, sore throat, constipation, abdominal pain, diarrhea, urinary symptoms.   In the ED: CXR was unremarkable. LFT were elevated and a RUQ US was performed that was unremarkable. CTH NC no acute intracranial bleed or masses.  (26 Jul 2023 15:30)      Hepatology HPI: sypm started on last Friday with acute onset severe headache, she received 3 injections? 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 symp continues and progressed, she used couple of extra strength Tylenol and Motrin for 2 days. she lives on [address] for years, no recent local or international travel. she is .     PAST MEDICAL & SURGICAL HISTORY:  Pyelitis      Urinary tract infection      History of cholecystectomy            FAMILY HISTORY:  No pertinent family history in first degree relatives        Social History:  Tobacco: denies  Alcohol: denies  Drugs: denies     Home Medications:        MEDICATIONS  (STANDING):  cefTRIAXone   IVPB 2000 milliGRAM(s) IV Intermittent every 24 hours  enoxaparin Injectable 40 milliGRAM(s) SubCutaneous every 24 hours  lactated ringers. 1000 milliLiter(s) (75 mL/Hr) IV Continuous <Continuous>  lactated ringers. 1000 milliLiter(s) (500 mL/Hr) IV Continuous <Continuous>  pantoprazole    Tablet 40 milliGRAM(s) Oral before breakfast    MEDICATIONS  (PRN):  ibuprofen  Tablet. 600 milliGRAM(s) Oral every 6 hours PRN Temp greater or equal to 38C (100.4F), Moderate Pain (4 - 6)      Allergies  No Known Allergies      Review of Systems:   Constitutional:  No Fever, No Chills  ENT/Mouth:  No Hearing Changes,  No Difficulty Swallowing  Eyes:  No Eye Pain, No Vision Changes  Cardiovascular:  No Chest Pain, No Palpitations  Respiratory:  No Cough, No Dyspnea  Gastrointestinal:  As described in HPI  Musculoskeletal:  No Joint Swelling, No Back Pain  Skin:  No Skin Lesions, No Jaundice  Neuro:  No Syncope, No Dizziness  Heme/Lymph:  No Bruising, No Bleeding.          Physical Examination:  T(C): 36.2 (07-27-23 @ 07:36), Max: 38.2 (07-27-23 @ 00:00)  HR: 102 (07-27-23 @ 07:36) (102 - 113)  BP: 109/67 (07-27-23 @ 07:36) (109/67 - 142/84)  RR: 18 (07-27-23 @ 07:36) (18 - 18)  SpO2: 97% (07-27-23 @ 07:36) (97% - 97%)          GENERAL: AAOx3, no acute distress.  HEAD:  Atraumatic, Normocephalic  EYES: conjunctiva and sclera clear  NECK: Supple, no JVD or thyromegaly  CHEST/LUNG: Clear to auscultation bilaterally; No wheeze, rhonchi, or rales  HEART: Regular rate and rhythm; normal S1, S2, No murmurs.  ABDOMEN: Soft, nontender, nondistended; Bowel sounds present  NEUROLOGY: No asterixis or tremor.   SKIN: Intact, no jaundice        Data:                        12.8   8.46  )-----------( 109      ( 27 Jul 2023 05:36 )             38.5     Hgb Trend:  12.8  07-27-23 @ 05:36  13.1  07-26-23 @ 01:12      07-27    134<L>  |  98  |  8<L>  ----------------------------<  145<H>  3.9   |  25  |  0.6<L>    Ca    8.1<L>      27 Jul 2023 05:36  Mg     1.6     07-27    TPro  5.9<L>  /  Alb  3.2<L>  /  TBili  0.7  /  DBili  x   /  AST  235<H>  /  ALT  376<H>  /  AlkPhos  314<H>  07-27    Liver panel trend:  TBili 0.7   /      /      /   AlkP 314   /   Tptn 5.9   /   Alb 3.2    /   DBili --      07-27  TBili 0.8   /      /      /   AlkP 280   /   Tptn 5.7   /   Alb 3.1    /   DBili 0.5      07-26  TBili 0.7   /      /      /   AlkP 348   /   Tptn 6.7   /   Alb 3.6    /   DBili --      07-26  TBili 0.4   /      /      /   AlkP 169   /   Tptn 6.4   /   Alb 3.7    /   DBili --      07-22              Radiology:    US Abdomen Upper Quadrant Right:   ACC: [id number] EXAM:  US ABDOMEN RT UPR QUADRANT   ORDERED BY: PENG SALMON     PROCEDURE DATE:  07/26/2023          INTERPRETATION:  CLINICAL INFORMATION: Pain    COMPARISON: 11/25/2022    TECHNIQUE: Sonography of the right upper quadrant.    FINDINGS:  Liver: Within normal limits.  Bile ducts: Normal caliber. Common bile duct measures 5 mm.  Gallbladder: Cholecystectomy.  Pancreas: Visualized portions are within normal limits.  Right kidney: 12.0 cm. No hydronephrosis.  Ascites: None.  IVC: Visualized portions are within normal limits.    IMPRESSION:  Unremarkable postcholecystectomy right upper quadrant abdominal   ultrasound.        --- End of Report ---            RAMBO ALFREDO MD; Attending Radiologist  This document has been electronically signed. Jul 26 2023  3:32AM (07-26-23 @ 03:26)     Heterosexual

## 2023-09-11 NOTE — ED ADULT TRIAGE NOTE - NSTRIAGECARE_GEN_A_ER
Most recent CMP reviewed.  We will recheck in 3 months along with urine microalbumin.  Continue to avoid nephrotoxins.   EKG

## 2024-07-09 NOTE — ED ADULT TRIAGE NOTE - NSTRIAGECARE_GEN_A_ER
SUBJECTIVE AND OBJECTIVE  Indication for Geriatrics and Palliative Care Services/INTERVAL HPI: GOC, PCU eval    OVERNIGHT EVENTS: called by pulmonary team to assist with symptom management and PCU candidacy. Patient seen this morning, lethargic but arousable by voice, he appears uncomfortable with mild dyspnea. His nephew Christopher is at bedside, Please see GOC section below for discussion details.     DNR on chart:DNI: Trial NIV  DNI: Trial NIV      Allergies    No Known Allergies    Intolerances    MEDICATIONS  (STANDING):  apixaban 2.5 milliGRAM(s) Oral every 12 hours  ascorbic acid 500 milliGRAM(s) Oral daily  benzocaine/menthol Lozenge 1 Lozenge Oral three times a day  calcitonin Injectable 270 International Unit(s) IntraMuscular every 12 hours  chlorhexidine 2% Cloths 1 Application(s) Topical daily  lactated ringers. 1000 milliLiter(s) (60 mL/Hr) IV Continuous <Continuous>  lactobacillus acidophilus 1 Tablet(s) Oral daily  metoprolol tartrate 12.5 milliGRAM(s) Oral two times a day  midodrine. 15 milliGRAM(s) Oral every 8 hours  multivitamin/minerals 1 Tablet(s) Oral daily  pantoprazole    Tablet 40 milliGRAM(s) Oral before breakfast  piperacillin/tazobactam IVPB.. 3.375 Gram(s) IV Intermittent every 8 hours  simethicone 80 milliGRAM(s) Chew four times a day  tamsulosin 0.4 milliGRAM(s) Oral at bedtime    MEDICATIONS  (PRN):  acetaminophen     Tablet .. 650 milliGRAM(s) Oral every 6 hours PRN Temp greater or equal to 38C (100.4F), Mild Pain (1 - 3)  albuterol/ipratropium for Nebulization 3 milliLiter(s) Nebulizer every 6 hours PRN Shortness of Breath and/or Wheezing  aluminum hydroxide/magnesium hydroxide/simethicone Suspension 30 milliLiter(s) Oral every 4 hours PRN Dyspepsia  glycopyrrolate Injectable 0.4 milliGRAM(s) IV Push every 6 hours PRN secretions  guaiFENesin Oral Liquid (Sugar-Free) 200 milliGRAM(s) Oral every 6 hours PRN Cough  HYDROmorphone  Injectable 0.3 milliGRAM(s) IV Push every 3 hours PRN dyspnea  HYDROmorphone  Injectable 0.2 milliGRAM(s) IV Push every 3 hours PRN pain  sodium chloride 0.65% Nasal 1 Spray(s) Both Nostrils four times a day PRN Nasal Congestion      ITEMS UNCHECKED ARE NOT PRESENT    PRESENT SYMPTOMS: [ x]Unable to self-report - see [ ] CPOT [x ] PAINADS [x ] RDOS  Source if other than patient:  [ ]Family   [ ]Team     Pain:  [ ]yes [x ]no  QOL impact -   Location -                    Aggravating factors -  Quality -  Radiation -  Timing-  Severity (0-10 scale):  Minimal acceptable level (0-10 scale):     Dyspnea:                           [ ]Mild [ x]Moderate [ ]Severe  Anxiety:                             [ ]Mild [ ]Moderate [ ]Severe  Fatigue:                             [ ]Mild [ ]Moderate [ x]Severe  Nausea:                             [ ]Mild [ ]Moderate [ ]Severe  Loss of appetite:              [ ]Mild [ ]Moderate [ ]Severe  Constipation:                    [ ]Mild [ ]Moderate [ ]Severe    PCSSQ[Palliative Care Spiritual Screening Question]   Severity (0-10):  Score of 4 or > indicate consideration of Chaplaincy referral.  Chaplaincy Referral: [x ] yes [ ] refused [ ] following [ ] Deferred     Caregiver [place]? : [ ] yes [x ] no [ ] Deferred [ ] Declined             Social work referral [ ] Patient & Family Centered Care Referral [ ]     Anticipatory Grief present?:  [ x] yes [ ] no  [ ] Deferred                  Social work referral [ ] Chaplaincy Referral[ ]    Other Symptoms:  [ x]All other review of systems negative     Palliative Performance Status Version 2:    30     %      http://[facility].org/files/news/palliative_performance_scale_ppsv2.pdf    PHYSICAL EXAM:  Vital Signs Last 24 Hrs  T(C): 36.6 (09 Jul 2024 11:45), Max: 37.4 (08 Jul 2024 16:55)  T(F): 97.9 (09 Jul 2024 11:45), Max: 99.3 (08 Jul 2024 16:55)  HR: 92 (09 Jul 2024 13:29) (92 - 132)  BP: 119/64 (09 Jul 2024 13:29) (94/62 - 119/64)  BP(mean): --  RR: 18 (09 Jul 2024 11:45) (18 - 20)  SpO2: 95% (09 Jul 2024 11:45) (86% - 95%)    Parameters below as of 09 Jul 2024 11:45  Patient On (Oxygen Delivery Method): nasal cannula  O2 Flow (L/min): 2   I&O's Summary    08 Jul 2024 07:01  -  09 Jul 2024 07:00  --------------------------------------------------------  IN: 680 mL / OUT: 1105 mL / NET: -425 mL    09 Jul 2024 07:01  -  09 Jul 2024 15:14  --------------------------------------------------------  IN: 240 mL / OUT: 550 mL / NET: -310 mL       GENERAL: [ ]Cachexia    [ ]Alert  [ ]Oriented x   [ x]Lethargic  [ ]Unarousable  [x ]Verbal  [ ]Non-Verbal  Behavioral:   [ ]Anxiety  [ ]Delirium [ ]Agitation [ ]Other  HEENT:  [ x]Normal   [ ]Dry mouth   [ ]ET Tube/Trach  [ ]Oral lesions  PULMONARY:   [ ]Clear [ ]Tachypnea  [ ]Audible excessive secretions   [ ]Rhonchi        [ ]Right [ ]Left [ ]Bilateral  [ ]Crackles        [ ]Right [ ]Left [ ]Bilateral  [ ]Wheezing     [ ]Right [ ]Left [ ]Bilateral  [ x]Diminished BS [ ] Right [ ]Left [ x]Bilateral  CARDIOVASCULAR:    [x ]Regular [ ]Irregular [ ]Tachy  [ ]Rich [ ]Murmur [ ]Other  GASTROINTESTINAL:  [ x]Soft  [ ]Distended   [x ]+BS  [x ]Non tender [ ]Tender  [ ]Other [ ]PEG [ ]OGT/ NGT   Last BM:   GENITOURINARY:  [ ]Normal [ x]Incontinent   [ ]Oliguria/Anuria   [ ]Michael  MUSCULOSKELETAL:   [ ]Normal   [ x]Weakness  [ x]Bed/Wheelchair bound [ ]Edema  NEUROLOGIC:   [ ]No focal deficits  [ x] Cognitive impairment  [ ] Dysphagia [ ]Dysarthria [ ] Paresis [ ]Other   SKIN:   [ ]Normal  [ ]Rash  [ ]Other  [ ]Pressure ulcer(s) [ ]y [ ]n present on admission    CRITICAL CARE:  [ ]Shock Present  [ ]Septic [ ]Cardiogenic [ ]Neurologic [ ]Hypovolemic  [ ]Vasopressors [ ]Inotropes  [ ]Respiratory failure present [ ]Mechanical Ventilation [ ]Non-invasive ventilatory support [ ]High-Flow   [ ]Acute  [ ]Chronic [ ]Hypoxic  [ ]Hypercarbic [ ]Other  [ ]Other organ failure     LABS:                        9.2    22.05 )-----------( 283      ( 09 Jul 2024 07:34 )             30.8   07-09    141  |  99  |  51<H>  ----------------------------<  102<H>  3.9   |  23  |  1.10    Ca    13.5<HH>      09 Jul 2024 07:34  Phos  2.5     07-09  Mg     2.4     07-09    TPro  6.6  /  Alb  2.6<L>  /  TBili  0.4  /  DBili  x   /  AST  14  /  ALT  15  /  AlkPhos  99  07-09      Urinalysis Basic - ( 09 Jul 2024 07:34 )    Color: x / Appearance: x / SG: x / pH: x  Gluc: 102 mg/dL / Ketone: x  / Bili: x / Urobili: x   Blood: x / Protein: x / Nitrite: x   Leuk Esterase: x / RBC: x / WBC x   Sq Epi: x / Non Sq Epi: x / Bacteria: x      RADIOLOGY & ADDITIONAL STUDIES:    < from: Xray Chest 1 View- PORTABLE-Routine (Xray Chest 1 View- PORTABLE-Routine .) (07.07.24 @ 10:29) >  IMPRESSION:  Stable, bilateral pleural-parenchymal pattern.    --- End of Report ---    < end of copied text >  < from: CT Chest w/ IV Cont (07.03.24 @ 15:26) >  IMPRESSION:    Pulmonary nodules, nodular pleural thickening, hepatic lesions, and   retroperitoneal lymphadenopathy, compatible with metastatic disease.    Small bilateral pleural effusions, decreased status post bilateral chest   tube placement. Small bilateral pneumothoraces.    Wall thickening of the sigmoid colon. Correlate for colitis.      --- End of Report ---    < end of copied text >      Protein Calorie Malnutrition Present: [ ]mild [ ]moderate [ ]severe [ ]underweight [ ]morbid obesity  https://www.andeal.org/vault/2440/web/files/ONC/Table_Clinical%20Characteristics%20to%20Document%20Malnutrition-White%20JV%20et%20al%202012.pdf    Height (cm): 170.2 (06-20-24 @ 14:44), 165.1 (04-23-24 @ 16:00), 165.1 (02-27-24 @ 08:06)  Weight (kg): 68 (06-20-24 @ 14:44), 74 (05-16-24 @ 11:00), 77.1 (02-27-24 @ 08:06)  BMI (kg/m2): 23.5 (06-20-24 @ 14:44), 27.1 (05-16-24 @ 11:00), 28.3 (04-23-24 @ 16:00)    [ ]PPSV2 < or = 30%  [ ]significant weight loss [ ]poor nutritional intake [ ]anasarca[ ]Artificial Nutrition    Other REFERRALS:  [ ]Hospice  [ ]Child Life  [ ]Social Work  [ ]Case management [ ]Holistic Therapy    EKG

## 2024-12-16 NOTE — ASU PREOP CHECKLIST - TEMPERATURE IN FAHRENHEIT (DEGREES F)
Peripheral Block    Pre-sedation assessment completed: 12/16/2024 11:58 AM    Patient reassessed immediately prior to procedure    Patient location during procedure: holding area  Start time: 12/16/2024 11:58 AM  Stop time: 12/16/2024 12:00 PM  Reason for block: at surgeon's request and post-op pain management  Performed by  Anesthesiologist: Mykel Ballard MD  Preanesthetic Checklist  Completed: patient identified, IV checked, site marked, risks and benefits discussed, surgical consent, monitors and equipment checked, pre-op evaluation and timeout performed  Prep:  Pt Position: supine  Sterile barriers:cap, washed/disinfected hands, gloves, mask and alcohol skin prep  Prep: ChloraPrep  Patient monitoring: blood pressure monitoring, continuous pulse oximetry and EKG  Procedure    Sedation: yes  Performed under: local infiltration  Guidance:ultrasound guided    ULTRASOUND INTERPRETATION.  Using ultrasound guidance a 21 G gauge needle was placed in close proximity to the nerve, at which point, under ultrasound guidance anesthetic was injected in the area of the nerve and spread of the anesthesia was seen on ultrasound in close proximity thereto.  There were no abnormalities seen on ultrasound; a digital image was taken; and the patient tolerated the procedure with no complications. Images:still images obtained, printed/placed on chart    Laterality:left  Block Type:adductor canal block  Injection Technique:single-shot  Needle Type:echogenic and Tuohy  Needle Gauge:21 G  Resistance on Injection: none    Medications Used: dexamethasone (DECADRON) injection - Injection   4 mg - 12/16/2024 12:00:00 PM  ropivacaine (NAROPIN) 0.5 % injection - Injection   15 mL - 12/16/2024 12:00:00 PM      Post Assessment  Injection Assessment: negative aspiration for heme, no paresthesia on injection and incremental injection  Patient Tolerance:comfortable throughout block  Complications:no  Additional Notes  Ultrasound guidance used  to visualize nerve anatomy, guide needle placement and verify local anesthetic disbursement.       Performed by: Mykel Ballard MD             97.9

## 2025-01-23 NOTE — ED STATDOCS - NS_ATTENDINGSCRIBE_ED_ALL_ED
I personally performed the service described in the documentation recorded by the scribe in my presence, and it accurately and completely records my words and actions.
Yes

## 2025-03-18 NOTE — ED STATDOCS - NS_ATTENDINGSCRIBE_ED_ALL_ED
HLD (hyperlipidemia)
I personally performed the service described in the documentation recorded by the scribe in my presence, and it accurately and completely records my words and actions.

## 2025-06-11 NOTE — ED ADULT NURSE NOTE - [facility] EDP DISCH DISPOSITION ADMI
"6/11/2025        Quan Louis MD  [address] [facility] [address]        Elia Ryan  1964    Chief Complaint   Patient presents with    Follow-up     6 month follow up w/ testing. Last seen 10/15/24. Patient denies any stroke type symptoms.        Dear Quan Louis MD:      HPI  I had the pleasure of seeing your patient Elia Ryan in the office today.   As you recall, Elia Ryan is a 61 y.o.  male who you are currently following for routine health maintenance.  He is here today for follow-up with testing.  He was previously seen by us prior to his anterior lumbar interbody fusion of L5-S1 by Dr. Chandler carbajal in October 2024.  At that time we discussed vascular workup due to risk factors and age.  He denies any symptoms of claudication or ischemia pain.  He denies any strokelike symptoms.  He is maintained on aspirin and Lipitor.  He did have noninvasive testing performed, which I did review in office.        Review of Systems   Constitutional: Negative.  Negative for diaphoresis and fever.   HENT: Negative.     Eyes: Negative.    Respiratory: Negative.  Negative for shortness of breath and wheezing.    Cardiovascular: Negative.  Negative for chest pain and leg swelling.   Gastrointestinal: Negative.  Negative for abdominal pain.   Endocrine: Negative.    Genitourinary: Negative.    Musculoskeletal:  Positive for back pain.   Skin: Negative.    Allergic/Immunologic: Negative.    Neurological: Negative.  Negative for dizziness and weakness.   Hematological: Negative.    Psychiatric/Behavioral: Negative.         /72   Pulse 84   Ht 188 cm (74\")   Wt 103 kg (227 lb)   SpO2 98%   BMI 29.15 kg/m²       Physical Exam  Vitals and nursing note reviewed.   Constitutional:       General: He is not in acute distress.     Appearance: Normal appearance. He is overweight. He is not diaphoretic.   HENT:      Head: Normocephalic. No right periorbital erythema or left periorbital erythema. "      Nose: Nose normal.   Eyes:      General: No scleral icterus.     Pupils: Pupils are equal.   Cardiovascular:      Rate and Rhythm: Normal rate and regular rhythm.      Pulses: Normal pulses.           Dorsalis pedis pulses are 2+ on the right side and 2+ on the left side.        Posterior tibial pulses are 2+ on the right side and 2+ on the left side.      Heart sounds: Normal heart sounds. No murmur heard.  Pulmonary:      Effort: Pulmonary effort is normal. No respiratory distress.      Breath sounds: Normal breath sounds.   Abdominal:      General: Bowel sounds are normal. There is no distension.      Palpations: Abdomen is soft.      Tenderness: There is no abdominal tenderness. There is no guarding.   Musculoskeletal:         General: No swelling or tenderness. Normal range of motion.      Cervical back: Normal range of motion and neck supple.      Right lower leg: No edema.      Left lower leg: No edema.   Feet:      Right foot:      Skin integrity: Skin integrity normal.      Left foot:      Skin integrity: Skin integrity normal.   Skin:     General: Skin is warm and dry.      Findings: No erythema or rash.   Neurological:      General: No focal deficit present.      Mental Status: He is alert and oriented to person, place, and time. Mental status is at baseline.      Cranial Nerves: No cranial nerve deficit.      Gait: Gait normal.   Psychiatric:         Attention and Perception: Attention normal.         Mood and Affect: Mood normal.         Behavior: Behavior normal.         Thought Content: Thought content normal.         Judgment: Judgment normal.     DIAGNOSTIC DATA:  Narrative & Impression   History: Carotid occlusive disease     IMPRESSION:  Impression:  1. There is less than 50% stenosis of the right internal carotid artery.  2. There is less than 50% stenosis of the left internal carotid artery.  3. Antegrade flow is demonstrated in bilateral vertebral arteries.     Comments: Bilateral carotid  vertebral arterial duplex scan was  performed.     Grayscale imaging shows intimal thickening and calcified elements at the  carotid bifurcation. The right internal carotid artery peak systolic  velocity is 46.8 cm/sec. The end-diastolic velocity is 10 cm/sec. The  right ICA/CCA ratio is approximately 0.8. These findings correlate with  less than 50% stenosis of the right internal carotid artery.     Grayscale imaging shows intimal thickening and calcified elements at the  carotid bifurcation. The left internal carotid artery peak systolic  velocity is 82.5 cm/sec. The end-diastolic velocity is 29 cm/sec. The  left ICA/CCA ratio is approximately 1.1. These findings correlate with  less than 50% stenosis of the left internal carotid artery.     Antegrade flow is demonstrated in bilateral vertebral arteries.        This report was signed and finalized on 6/11/2025 2:41 PM by Dr. Gonzales Hilton MD.              Patient Active Problem List   Diagnosis    Chronic neck pain    Essential hypertension    Type 2 diabetes mellitus without complication, without long-term current use of insulin    PTSD (post-traumatic stress disorder)    Tobacco abuse    Hyperlipidemia    Lipoma of neck    Degenerative disc disease at L5-S1 level    Lumbar pain    Lumbar radiculopathy    Vitamin D deficiency    Overweight (BMI 25.0-29.9)         ICD-10-CM ICD-9-CM   1. Bilateral carotid artery stenosis  I65.23 433.10     433.30   2. PAD (peripheral artery disease)  I73.9 443.9   3. Essential hypertension  I10 401.9   4. Hyperlipidemia, unspecified hyperlipidemia type  E78.5 272.4   5. Type 2 diabetes mellitus without complication, without long-term current use of insulin  E11.9 250.00   6. Overweight (BMI 25.0-29.9)  E66.3 278.02   7. Tobacco abuse  Z72.0 305.1             Plan: After thoroughly evaluating Elia Ryan, I believe the best course of action is to remain conservative from vascular surgery standpoint.  He denies any  strokelike symptoms.  He denies any symptoms of claudication or ischemia pain.  I did review his testing which shows no arterial insufficiency to his bilateral lower extremities.  His carotid duplex shows less than 50% bilateral carotid stenosis.  We will see him back in 1 year with repeat noninvasive testing to include carotid duplex and ABIs for continued surveillance.  He should continue his aspirin 81 mg daily and Lipitor 20 mg daily in addition to his other medications.  I did discuss vascular risk factors as it pertains to the progression of vascular disease including controlling his hypertension, hyperlipidemia, diabetes, and smoking cessation.  His blood pressure stable today in office.  His lipid panel shows all values within normal limits.  His hemoglobin A1c is uncontrolled at 7.6%.  Body mass index is 29.15 kg/m².  BMI is >= 25 and <30. (Overweight) The following options were offered after discussion;: exercise counseling/recommendations and nutrition counseling/recommendations      This was all discussed in full with complete understanding.    Thank you for allowing me to participate in the care of your patient.  Please do not hesitate with any questions or concerns.  I will keep you aware of any further encounters with Elia Ryan.        Sincerely yours,         ZABRINA Agarwal            Surgery

## (undated) DEVICE — LABELS BLANK W PEN

## (undated) DEVICE — DRSG CURITY GAUZE SPONGE 4 X 4" 12-PLY

## (undated) DEVICE — GOWN LG

## (undated) DEVICE — WARMING BLANKET UPPER ADULT

## (undated) DEVICE — GLV 8 PROTEXIS (WHITE)

## (undated) DEVICE — DRSG TELFA 3 X 8

## (undated) DEVICE — TUBING SUCTION NONCONDUCTIVE 6MM X 12FT

## (undated) DEVICE — DRAPE 3/4 SHEET 52X76"

## (undated) DEVICE — VENODYNE/SCD SLEEVE CALF MEDIUM

## (undated) DEVICE — SOL ANTI FOG

## (undated) DEVICE — Device